# Patient Record
Sex: FEMALE | Race: WHITE | Employment: FULL TIME | URBAN - METROPOLITAN AREA
[De-identification: names, ages, dates, MRNs, and addresses within clinical notes are randomized per-mention and may not be internally consistent; named-entity substitution may affect disease eponyms.]

---

## 2017-04-25 ENCOUNTER — OFFICE VISIT (OUTPATIENT)
Dept: INTERNAL MEDICINE CLINIC | Age: 27
End: 2017-04-25

## 2017-04-25 VITALS
RESPIRATION RATE: 16 BRPM | WEIGHT: 117.7 LBS | HEIGHT: 65 IN | TEMPERATURE: 98.8 F | HEART RATE: 73 BPM | SYSTOLIC BLOOD PRESSURE: 132 MMHG | BODY MASS INDEX: 19.61 KG/M2 | OXYGEN SATURATION: 100 % | DIASTOLIC BLOOD PRESSURE: 79 MMHG

## 2017-04-25 DIAGNOSIS — F40.231 SEVERE NEEDLE PHOBIA: ICD-10-CM

## 2017-04-25 DIAGNOSIS — Z82.49 FAMILY HISTORY OF CARDIAC DISORDER IN FATHER: ICD-10-CM

## 2017-04-25 DIAGNOSIS — Z86.59 HISTORY OF DEPRESSION: ICD-10-CM

## 2017-04-25 DIAGNOSIS — E78.00 HYPERCHOLESTEROLEMIA: Primary | ICD-10-CM

## 2017-04-25 DIAGNOSIS — Z83.42 FAMILY HISTORY OF HYPERCHOLESTEROLEMIA: ICD-10-CM

## 2017-04-25 NOTE — PATIENT INSTRUCTIONS
YouFastUnlock Activation    Thank you for requesting access to YouFastUnlock. Please follow the instructions below to securely access and download your online medical record. YouFastUnlock allows you to send messages to your doctor, view your test results, renew your prescriptions, schedule appointments, and more. How Do I Sign Up? 1. In your internet browser, go to [email or web address].[email or web address] Tag  2. Click on the First Time User? Click Here link in the Sign In box. You will be redirect to the New Member Sign Up page. 3. Enter your YouFastUnlock Access Code exactly as it appears below. You will not need to use this code after youve completed the sign-up process. If you do not sign up before the expiration date, you must request a new code. YouFastUnlock Access Code: [email or web address]  Expires: 2017  9:14 AM (This is the date your YouFastUnlock access code will )    4. Enter the last four digits of your Social Security Number (xxxx) and Date of Birth (mm/dd/yyyy) as indicated and click Submit. You will be taken to the next sign-up page. 5. Create a YouFastUnlock ID. This will be your YouFastUnlock login ID and cannot be changed, so think of one that is secure and easy to remember. 6. Create a YouFastUnlock password. You can change your password at any time. 7. Enter your Password Reset Question and Answer. This can be used at a later time if you forget your password. 8. Enter your e-mail address. You will receive e-mail notification when new information is available in [address] 9. Click Sign Up. You can now view and download portions of your medical record. 10. Click the Download Summary menu link to download a portable copy of your medical information. Additional Information    If you have questions, please visit the Frequently Asked Questions section of the YouFastUnlock website at [email or web address]. [email or web address] Craft Coffee/Mettlhart/. Remember, YouFastUnlock is NOT to be used for urgent needs. For medical emergencies, dial 911. StopPixSpree. [employer]  For the next 21 days:  Write 3 things for which your grateful  Journal 2 minutes on one of the three items  Text/call/email- one person a day and tell them what your grateful for love about them etc.  Watch a very happy brain by Dr Caren Calhoun this

## 2017-04-25 NOTE — PROGRESS NOTES
Chief Complaint   Patient presents with    Establish Care     [facility])       Subjective:   Natalia Thompson 27 y.o.  female with a  past medical history reviewed see below. comes in today to establish care. Results from her work screening 3/17/17 reviewed BMI 20 bp 122/70 chol 333 hdl 76 non hdl 257 total chol/hdl 4.4 fasting glucose 88. 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 All other systems reviewed and are negative        Allergies   Allergen Reactions    Mushroom Nausea and Vomiting     Past Medical History:   Diagnosis Date    Depression     Hypercholesterolemia     PCOS (polycystic ovarian syndrome) 2009     History reviewed. No pertinent surgical history.   Family History   Problem Relation Age of Onset    Heart Disease Father 27      at age 45     Elevated Lipids Father     Thyroid Disease Sister    Rubi Stack Elevated Lipids Sister     Elevated Lipids Brother     Heart Disease Brother      ? congenital     Elevated Lipids Maternal Grandfather     Elevated Lipids Sister      Social History   Substance Use Topics    Smoking status: Never Smoker    Smokeless tobacco: Never Used    Alcohol use 4.2 oz/week     7 Glasses of wine per week          Objective:     Visit Vitals    /79 (BP 1 Location: Left arm, BP Patient Position: Sitting)    Pulse 73    Temp 98.8 °F (37.1 °C) (Oral)    Resp 16    Ht 5' 5\" (1.651 m)    Wt 117 lb 11.2 oz (53.4 kg)    LMP 2017    SpO2 100%    BMI 19.59 kg/m2     Gen: NAD, pleasant  HEENT: normal appearing head, nares patent, PERRLA, EOMI, oropharynx no erythema, no cervical lymphadenopathy neck supple   Cardio: RRR nl S1S2 no murmur  Lungs CTAB no wheeze no rales no rhonchi  ABD Soft non tender non distended + bowel sounds  Extremities: full ROM X 4 no clubbing no cyanosis  Neuro: no gross focal deficits noted, alert and orientated X 3  Psych.: well groomed anxious about blood draw + depression. See phq9 score of 7     Assessment/Plan:   Marisol Huertas was seen today for establish care. Diagnoses and all orders for this visit:    Hypercholesterolemia  -     NMR LIPOPROFILE  -     VITAMIN D, 25 HYDROXY  -     HEMOGLOBIN A1C WITH EAG  -     THYROID PANEL W/TSH  -     LIPOPROTEIN PHENOTYPING; Future  -     LIPOPROTEIN-ASSOCIATED PHOSPHOLIPASE A2, ACTIVITY  -     CHD: LDLR/APOB/PCSK9 FULL SEQ  -     APOLIPOPROTEIN B/A RATIO  -     METABOLIC PANEL, COMPREHENSIVE  -     CK  -     CBC WITH AUTOMATED DIFF  -     UA/M W/RFLX CULTURE, COMP  -     LIPOPROTEIN PHENOTYPING    Family history of hypercholesterolemia  -     NMR LIPOPROFILE  -     VITAMIN D, 25 HYDROXY  -     HEMOGLOBIN A1C WITH EAG  -     THYROID PANEL W/TSH  -     LIPOPROTEIN PHENOTYPING; Future  -     LIPOPROTEIN-ASSOCIATED PHOSPHOLIPASE A2, ACTIVITY  -     CHD: LDLR/APOB/PCSK9 FULL SEQ  -     APOLIPOPROTEIN B/A RATIO  -     METABOLIC PANEL, COMPREHENSIVE  -     CK  -     CBC WITH AUTOMATED DIFF  -     UA/M W/RFLX CULTURE, COMP  -     LIPOPROTEIN PHENOTYPING    Severe needle phobia-fainter!   -     NMR LIPOPROFILE  -     VITAMIN D, 25 HYDROXY  -     HEMOGLOBIN A1C WITH EAG  -     THYROID PANEL W/TSH  -     LIPOPROTEIN PHENOTYPING; Future  -     LIPOPROTEIN-ASSOCIATED PHOSPHOLIPASE A2, ACTIVITY  -     CHD: LDLR/APOB/PCSK9 FULL SEQ  -     APOLIPOPROTEIN B/A RATIO  -     METABOLIC PANEL, COMPREHENSIVE  -     CK  -     CBC WITH AUTOMATED DIFF  -     UA/M W/RFLX CULTURE, COMP  -     LIPOPROTEIN PHENOTYPING    History of depression  -     NMR LIPOPROFILE  -     VITAMIN D, 25 HYDROXY  -     HEMOGLOBIN A1C WITH EAG  -     THYROID PANEL W/TSH  -     LIPOPROTEIN PHENOTYPING;  Future  - LIPOPROTEIN-ASSOCIATED PHOSPHOLIPASE A2, ACTIVITY  -     CHD: LDLR/APOB/PCSK9 FULL SEQ  -     APOLIPOPROTEIN B/A RATIO  -     METABOLIC PANEL, COMPREHENSIVE  -     CK  -     CBC WITH AUTOMATED DIFF  -     UA/M W/RFLX CULTURE, COMP  -     LIPOPROTEIN PHENOTYPING    Family history of cardiac disorder in father  -     Clinton Haywood  -     VITAMIN D, 25 HYDROXY  -     HEMOGLOBIN A1C WITH EAG  -     THYROID PANEL W/TSH  -     LIPOPROTEIN PHENOTYPING; Future  -     LIPOPROTEIN-ASSOCIATED PHOSPHOLIPASE A2, ACTIVITY  -     CHD: LDLR/APOB/PCSK9 FULL SEQ  -     APOLIPOPROTEIN B/A RATIO  -     METABOLIC PANEL, COMPREHENSIVE  -     CK  -     CBC WITH AUTOMATED DIFF  -     UA/M W/RFLX CULTURE, COMP  -     LIPOPROTEIN PHENOTYPING      Follow-up Disposition:  Return in about 2 weeks (around 5/9/2017) for review labs 30 min please and cpe .increase cv exercise numbed pt for labs was unable to have done will return in the am avs printed and given to the pt. .  Verbal consent given for text messaging and number verified with pt. & Sent text message The patient voiced understanding of the above. Medication side effects were reviewed with the patient. Call with any concerns.

## 2017-04-25 NOTE — MR AVS SNAPSHOT
Visit Information Date & Time Provider Department Dept. Phone Encounter #  
 4/25/2017  8:30 AM Hayde Aleman MD [facility] [phone] [id number] Follow-up Instructions Return in about 2 weeks (around 5/9/2017) for review labs 30 min please and cpe . Upcoming Health Maintenance Date Due DTaP/Tdap/Td series (1 - Tdap) 8/9/2009 PAP AKA CERVICAL CYTOLOGY 8/9/2009 INFLUENZA AGE 9 TO ADULT 8/1/2016 Allergies as of 4/25/2017  Review Complete On: 4/25/2017 By: Hayed Aleman MD  
  
 Severity Noted Reaction Type Reactions Mushroom  04/25/2017    Nausea and Vomiting Current Immunizations  Reviewed on 4/25/2017 Name Date Influenza Vaccine 9/30/2016 Tdap 4/29/2016 Reviewed by Hayde Aleman MD on 4/25/2017 at  9:01 AM  
 Reviewed by Hayde Aleman MD on 4/25/2017 at  9:16 AM  
You Were Diagnosed With   
  
 Codes Comments Hypercholesterolemia    -  Primary ICD-10-CM: E78.00 ICD-9-CM: 272.0 Family history of hypercholesterolemia     ICD-10-CM: Z83.42 
ICD-9-CM: V18.19 Severe needle phobia     ICD-10-CM: F40.231 ICD-9-CM: 300.29 History of depression     ICD-10-CM: Z86.59 
ICD-9-CM: V11.8 Family history of cardiac disorder in father     ICD-10-CM: Z80.55 
ICD-9-CM: V17.49 Vitals BP Pulse Temp Resp Height(growth percentile) Weight(growth percentile) 132/79 (BP 1 Location: Left arm, BP Patient Position: Sitting) 73 98.8 °F (37.1 °C) (Oral) 16 5' 5\" (1.651 m) 117 lb 11.2 oz (53.4 kg) LMP SpO2 BMI OB Status Smoking Status 04/02/2017 100% 19.59 kg/m2 Having regular periods Never Smoker Vitals History BMI and BSA Data Body Mass Index Body Surface Area  
 19.59 kg/m 2 1.56 m 2 Your Updated Medication List  
  
Notice  As of 4/25/2017  9:30 AM  
 You have not been prescribed any medications. We Performed the Following APOLIPOPROTEIN B/A RATIO C6092238 CPT(R)] CBC WITH AUTOMATED DIFF [49400 CPT(R)] CHD: LDLR/APOB/PCSK9 FULL SEQ [[id number] Custom] CK A1204324 CPT(R)] HEMOGLOBIN A1C WITH EAG [87495 CPT(R)] LIPOPROTEIN-ASSOCIATED PHOSPHOLIPASE A2, ACTIVITY [[id number] Custom] METABOLIC PANEL, COMPREHENSIVE [41854 CPT(R)] NMR LIPOPROFILE [id number] CPT(R)] THYROID PANEL W/TSH [02661 CPT(R)] UA/M W/RFLX CULTURE, COMP [04273 CPT(R)] VITAMIN D, 25 HYDROXY G5458041 CPT(R)] Follow-up Instructions Return in about 2 weeks (around 2017) for review labs 30 min please and cpe . To-Do List   
 2017 Lab:  LIPOPROTEIN PHENOTYPING Patient Instructions MyChart Activation Thank you for requesting access to Cuff-Protect. Please follow the instructions below to securely access and download your online medical record. Cuff-Protect allows you to send messages to your doctor, view your test results, renew your prescriptions, schedule appointments, and more. How Do I Sign Up? 1. In your internet browser, go to [email or web address].Code for [employer] 
2. Click on the First Time User? Click Here link in the Sign In box. You will be redirect to the New Member Sign Up page. 3. Enter your Cuff-Protect Access Code exactly as it appears below. You will not need to use this code after youve completed the sign-up process. If you do not sign up before the expiration date, you must request a new code. Cuff-Protect Access Code: [email or web address] 
Expires: 2017  9:14 AM (This is the date your Cuff-Protect access code will ) 4. Enter the last four digits of your Social Security Number (xxxx) and Date of Birth (mm/dd/yyyy) as indicated and click Submit. You will be taken to the next sign-up page. 5. Create a Cuff-Protect ID. This will be your Cuff-Protect login ID and cannot be changed, so think of one that is secure and easy to remember. 6. Create a Cuff-Protect password. You can change your password at any time. 7. Enter your Password Reset Question and Answer. This can be used at a later time if you forget your password. 8. Enter your e-mail address. You will receive e-mail notification when new information is available in [address] 9. Click Sign Up. You can now view and download portions of your medical record. 10. Click the Download Summary menu link to download a portable copy of your medical information. Additional Information If you have questions, please visit the Frequently Asked Questions section of the BuildZoom website at [email or web address] Remember, BuildZoom is NOT to be used for urgent needs. For medical emergencies, dial 911. Clerk For the next 21 days: 
Write 3 things for which your grateful Journal 2 minutes on one of the three items Text/call/email- one person a day and tell them what your grateful for love about them etc. 
Watch a very happy brain by Dr Nieves Gave this Introducing [facility] & HEALTH SERVICES! Nadir Madrid introduces BuildZoom patient portal. Now you can access parts of your medical record, email your doctor's office, and request medication refills online. 1. In your internet browser, go to [email or web address] 2. Click on the First Time User? Click Here link in the Sign In box. You will see the New Member Sign Up page. 3. Enter your BuildZoom Access Code exactly as it appears below. You will not need to use this code after youve completed the sign-up process. If you do not sign up before the expiration date, you must request a new code. · BuildZoom Access Code: [email or web address] 
Expires: 7/24/2017  9:14 AM 
 
4. Enter the last four digits of your Social Security Number (xxxx) and Date of Birth (mm/dd/yyyy) as indicated and click Submit. You will be taken to the next sign-up page. 5. Create a BuildZoom ID.  This will be your BuildZoom login ID and cannot be changed, so think of one that is secure and easy to remember. 6. Create a GIS Cloud password. You can change your password at any time. 7. Enter your Password Reset Question and Answer. This can be used at a later time if you forget your password. 8. Enter your e-mail address. You will receive e-mail notification when new information is available in [address] 9. Click Sign Up. You can now view and download portions of your medical record. 10. Click the Download Summary menu link to download a portable copy of your medical information. If you have questions, please visit the Frequently Asked Questions section of the GIS Cloud website. Remember, GIS Cloud is NOT to be used for urgent needs. For medical emergencies, dial 911. Now available from your iPhone and Android! Please provide this summary of care documentation to your next provider. Your primary care clinician is listed as Jasbir Barth. If you have any questions after today's visit, please call [phone].

## 2017-05-02 LAB — LDL-C, EXTERNAL: 201

## 2017-05-04 ENCOUNTER — TELEPHONE (OUTPATIENT)
Dept: INTERNAL MEDICINE CLINIC | Age: 27
End: 2017-05-04

## 2017-05-04 ENCOUNTER — LAB ONLY (OUTPATIENT)
Dept: INTERNAL MEDICINE CLINIC | Age: 27
End: 2017-05-04

## 2017-05-04 DIAGNOSIS — E78.00 HYPERCHOLESTEROLEMIA: Primary | ICD-10-CM

## 2017-05-04 RX ORDER — ATORVASTATIN CALCIUM 40 MG/1
40 TABLET, FILM COATED ORAL DAILY
Qty: 30 TAB | Refills: 1 | Status: SHIPPED | OUTPATIENT
Start: 2017-05-04 | End: 2017-05-24 | Stop reason: SDUPTHER

## 2017-05-05 LAB
CHOLEST SERPL-MCNC: 295 MG/DL (ref 100–199)
HDL SERPL-SCNC: 31 UMOL/L
HDLC SERPL-MCNC: 70 MG/DL
INTERPRETATION, 910389: NORMAL
LDL SERPL QN: 21.9 NM
LDL SERPL-SCNC: 1976 NMOL/L
LDL SMALL SERPL-SCNC: 212 NMOL/L
LDL-C, EXTERNAL: 216
LDLC SERPL CALC-MCNC: 216 MG/DL (ref 0–99)
LP-IR SCORE SERPL: <25
TRIGL SERPL-MCNC: 46 MG/DL (ref 0–149)

## 2017-05-22 LAB — CREATININE, EXTERNAL: 0.7

## 2017-05-24 ENCOUNTER — OFFICE VISIT (OUTPATIENT)
Dept: INTERNAL MEDICINE CLINIC | Age: 27
End: 2017-05-24

## 2017-05-24 VITALS
WEIGHT: 117.6 LBS | OXYGEN SATURATION: 99 % | SYSTOLIC BLOOD PRESSURE: 108 MMHG | DIASTOLIC BLOOD PRESSURE: 67 MMHG | HEART RATE: 83 BPM | HEIGHT: 65 IN | BODY MASS INDEX: 19.59 KG/M2 | RESPIRATION RATE: 16 BRPM | TEMPERATURE: 96.2 F

## 2017-05-24 DIAGNOSIS — Z71.2 ENCOUNTER TO DISCUSS TEST RESULTS: ICD-10-CM

## 2017-05-24 DIAGNOSIS — E78.01 FAMILIAL HYPERCHOLESTEREMIA: Primary | ICD-10-CM

## 2017-05-24 LAB
25(OH)D3+25(OH)D2 SERPL-MCNC: 38.6 NG/ML (ref 30–100)
ALBUMIN SERPL-MCNC: 5.2 G/DL (ref 3.5–5.5)
ALBUMIN/GLOB SERPL: 2.2 {RATIO} (ref 1.2–2.2)
ALP SERPL-CCNC: 48 IU/L (ref 39–117)
ALT SERPL-CCNC: 12 IU/L (ref 0–32)
APO A-I SERPL-MCNC: 170 MG/DL (ref 116–209)
APO B SERPL-MCNC: 160 MG/DL (ref 54–133)
APO B/APO A-I SERPL: 0.9 RATIO UNITS (ref 0–0.6)
APOB+LDLR+PCSK9 GENE MUT ANL BLD/T: NORMAL
APPEARANCE UR: CLEAR
AST SERPL-CCNC: 16 IU/L (ref 0–40)
BACTERIA #/AREA URNS HPF: NORMAL /[HPF]
BASOPHILS # BLD AUTO: 0 X10E3/UL (ref 0–0.2)
BASOPHILS NFR BLD AUTO: 0 %
BILIRUB SERPL-MCNC: 0.3 MG/DL (ref 0–1.2)
BILIRUB UR QL STRIP: NEGATIVE
BUN SERPL-MCNC: 8 MG/DL (ref 6–20)
BUN/CREAT SERPL: 11 (ref 9–23)
CALCIUM SERPL-MCNC: 9.6 MG/DL (ref 8.7–10.2)
CASTS URNS QL MICRO: NORMAL /LPF
CHLORIDE SERPL-SCNC: 97 MMOL/L (ref 96–106)
CHOLEST SERPL-MCNC: 279 MG/DL (ref 100–199)
CK SERPL-CCNC: 44 U/L (ref 24–173)
CO2 SERPL-SCNC: 23 MMOL/L (ref 18–29)
COLOR UR: YELLOW
COMMENTS, 550733: NORMAL
CREAT SERPL-MCNC: 0.7 MG/DL (ref 0.57–1)
EOSINOPHIL # BLD AUTO: 0.1 X10E3/UL (ref 0–0.4)
EOSINOPHIL NFR BLD AUTO: 1 %
EPI CELLS #/AREA URNS HPF: NORMAL /HPF
ERYTHROCYTE [DISTWIDTH] IN BLOOD BY AUTOMATED COUNT: 13.4 % (ref 12.3–15.4)
EST. AVERAGE GLUCOSE BLD GHB EST-MCNC: 100 MG/DL
FT4I SERPL CALC-MCNC: 2.3 (ref 1.2–4.9)
GLOBULIN SER CALC-MCNC: 2.4 G/DL (ref 1.5–4.5)
GLUCOSE SERPL-MCNC: 88 MG/DL (ref 65–99)
GLUCOSE UR QL: NEGATIVE
HBA1C MFR BLD: 5.1 % (ref 4.8–5.6)
HCT VFR BLD AUTO: 40.9 % (ref 34–46.6)
HDLC SERPL-MCNC: 66 MG/DL
HGB BLD-MCNC: 13.5 G/DL (ref 11.1–15.9)
HGB UR QL STRIP: NEGATIVE
IMM GRANULOCYTES # BLD: 0 X10E3/UL (ref 0–0.1)
IMM GRANULOCYTES NFR BLD: 0 %
INTERPRETATION, 910389: NORMAL
INTERPRETATION, 910389: NORMAL
KETONES UR QL STRIP: NEGATIVE
LDLC SERPL CALC-MCNC: 201 MG/DL (ref 0–99)
LEUKOCYTE ESTERASE UR QL STRIP: NEGATIVE
LP SERPL ELPH-IMP: ABNORMAL
LP-PLA2(1) ACTIVITY: 34 NMOL/MIN/ML
LYMPHOCYTES # BLD AUTO: 2.2 X10E3/UL (ref 0.7–3.1)
LYMPHOCYTES NFR BLD AUTO: 41 %
MCH RBC QN AUTO: 31.5 PG (ref 26.6–33)
MCHC RBC AUTO-ENTMCNC: 33 G/DL (ref 31.5–35.7)
MCV RBC AUTO: 96 FL (ref 79–97)
MICRO URNS: ABNORMAL
MICRO URNS: ABNORMAL
MONOCYTES # BLD AUTO: 0.5 X10E3/UL (ref 0.1–0.9)
MONOCYTES NFR BLD AUTO: 10 %
NEUTROPHILS # BLD AUTO: 2.5 X10E3/UL (ref 1.4–7)
NEUTROPHILS NFR BLD AUTO: 48 %
NITRITE UR QL STRIP: NEGATIVE
PDF IMAGE, 910387: NORMAL
PH UR STRIP: 6.5 [PH] (ref 5–7.5)
PLATELET # BLD AUTO: 199 X10E3/UL (ref 150–379)
POTASSIUM SERPL-SCNC: 4.3 MMOL/L (ref 3.5–5.2)
PROT SERPL-MCNC: 7.6 G/DL (ref 6–8.5)
PROT UR QL STRIP: NEGATIVE
RBC # BLD AUTO: 4.28 X10E6/UL (ref 3.77–5.28)
RBC #/AREA URNS HPF: NORMAL /HPF
RELATIVE RISK: NORMAL
SODIUM SERPL-SCNC: 138 MMOL/L (ref 134–144)
SP GR UR: <=1.005 (ref 1–1.03)
T3RU NFR SERPL: 31 % (ref 24–39)
T4 SERPL-MCNC: 7.5 UG/DL (ref 4.5–12)
TRIGL SERPL-MCNC: 58 MG/DL (ref 0–149)
TSH SERPL DL<=0.005 MIU/L-ACNC: 2.53 UIU/ML (ref 0.45–4.5)
URINALYSIS REFLEX , 377201: ABNORMAL
UROBILINOGEN UR STRIP-MCNC: 0.2 MG/DL (ref 0.2–1)
VLDLC SERPL CALC-MCNC: 12 MG/DL (ref 5–40)
WBC # BLD AUTO: 5.3 X10E3/UL (ref 3.4–10.8)
WBC #/AREA URNS HPF: NORMAL /HPF

## 2017-05-24 RX ORDER — EZETIMIBE 10 MG/1
10 TABLET ORAL DAILY
Qty: 30 TAB | Refills: 0 | Status: SHIPPED | OUTPATIENT
Start: 2017-05-24 | End: 2017-06-22 | Stop reason: SDUPTHER

## 2017-05-24 RX ORDER — ATORVASTATIN CALCIUM 40 MG/1
40 TABLET, FILM COATED ORAL DAILY
Qty: 90 TAB | Refills: 1 | Status: SHIPPED | OUTPATIENT
Start: 2017-05-24

## 2017-05-24 NOTE — MR AVS SNAPSHOT
Visit Information Date & Time Provider Department Dept. Phone Encounter #  
 5/24/2017  8:45  [facility] [address] [facility] [place] [id number] Follow-up Instructions Return in about 1 month (around 6/24/2017) for POC lipid fasting refill and cpe with a pap 1 month 30 min . Upcoming Health Maintenance Date Due  
 PAP AKA CERVICAL CYTOLOGY 8/9/2009 INFLUENZA AGE 9 TO ADULT 8/1/2017 DTaP/Tdap/Td series (2 - Td) 4/29/2026 Allergies as of 5/24/2017  Review Complete On: 5/24/2017 By: Etta Bojorquez Severity Noted Reaction Type Reactions Mushroom  04/25/2017    Nausea and Vomiting Current Immunizations  Reviewed on 4/25/2017 Name Date Influenza Vaccine 9/30/2016 Tdap 4/29/2016 Not reviewed this visit You Were Diagnosed With   
  
 Codes Comments Familial hypercholesteremia    -  Primary ICD-10-CM: E78.01 
ICD-9-CM: 272.0 Encounter to discuss test results     ICD-10-CM: Z71.89 ICD-9-CM: V65.49 Vitals BP Pulse Temp Resp Height(growth percentile) Weight(growth percentile) 108/67 (BP 1 Location: Left arm, BP Patient Position: Sitting) 83 96.2 °F (35.7 °C) 16 5' 5\" (1.651 m) 117 lb 9.6 oz (53.3 kg) LMP SpO2 BMI OB Status Smoking Status 05/17/2017 99% 19.57 kg/m2 Having regular periods Never Smoker Vitals History BMI and BSA Data Body Mass Index Body Surface Area  
 19.57 kg/m 2 1.56 m 2 Preferred Pharmacy Pharmacy Name Phone [address] [facility], [facility]  [phone] Your Updated Medication List  
  
   
This list is accurate as of: 5/24/17  9:06 AM.  Always use your most recent med list.  
  
  
  
  
 atorvastatin 40 mg tablet Commonly known as:  LIPITOR Take 1 Tab by mouth daily. ezetimibe 10 mg tablet Commonly known as:  Lucía Barrientos Take 1 Tab by mouth daily. Prescriptions Sent to Pharmacy Refills  
 atorvastatin (LIPITOR) 40 mg tablet 1 Sig: Take 1 Tab by mouth daily. Class: Normal  
 Pharmacy: [address] Ph #: [phone] Route: Oral  
 ezetimibe (ZETIA) 10 mg tablet 0 Sig: Take 1 Tab by mouth daily. Class: Normal  
 Pharmacy: [address] Ph #: [phone] Route: Oral  
  
We Performed the Following REFERRAL TO GENETICS [LBE28 Custom] Comments:  
 Please evaluate patient for  Familial hypercholesterolemia family planning Follow-up Instructions Return in about 1 month (around 6/24/2017) for POC lipid fasting refill and cpe with a pap 1 month 30 min . Referral Information Referral ID Referred By Referred To  
  
 [id number] JOSEP MCLEOD [facility] [id number] Yordan Valdes Visits Status Start Date End Date 1 New Request 5/24/17 5/24/18 If your referral has a status of pending review or denied, additional information will be sent to support the outcome of this decision. Patient Instructions Lipid. org 
National lipid association. Introducing [facility] & HEALTH SERVICES! Dear Pratik Needs: Thank you for requesting a eyeOS account. Our records indicate that you already have an active eyeOS account. You can access your account anytime at [email or web address] Did you know that you can access your hospital and ER discharge instructions at any time in eyeOS? You can also review all of your test results from your hospital stay or ER visit. Additional Information If you have questions, please visit the Frequently Asked Questions section of the eyeOS website at [email or web address] Remember, MyChart is NOT to be used for urgent needs. For medical emergencies, dial 911. Now available from your iPhone and Android! Please provide this summary of care documentation to your next provider. Your primary care clinician is listed as Radha Roberts. If you have any questions after today's visit, please call [phone].

## 2017-05-24 NOTE — PROGRESS NOTES
Chief Complaint   Patient presents with    Results     Rm 7           Subjective:   Pravin German 27 y.o.  female with a  past medical history reviewed see below. comes in today for test results. ROS: otherwise feeling generally well. All other systems reviewed and are negative      Current Outpatient Prescriptions   Medication Sig Dispense Refill    atorvastatin (LIPITOR) 40 mg tablet Take 1 Tab by mouth daily. 90 Tab 1    ezetimibe (ZETIA) 10 mg tablet Take 1 Tab by mouth daily. 30 Tab 0     Allergies   Allergen Reactions    Mushroom Nausea and Vomiting     Past Medical History:   Diagnosis Date    Depression     Hypercholesterolemia     PCOS (polycystic ovarian syndrome) 2009     No past surgical history on file.   Family History   Problem Relation Age of Onset    Heart Disease Father 27      at age 45     Elevated Lipids Father     Thyroid Disease Sister    [facility] Elevated Lipids Sister     Elevated Lipids Brother     Heart Disease Brother      ? congenital     Elevated Lipids Maternal Grandfather     Elevated Lipids Sister      Social History   Substance Use Topics    Smoking status: Never Smoker    Smokeless tobacco: Never Used    Alcohol use 4.2 oz/week     7 Glasses of wine per week          Objective:     Visit Vitals    /67 (BP 1 Location: Left arm, BP Patient Position: Sitting)    Pulse 83    Temp 96.2 °F (35.7 °C)    Resp 16    Ht 5' 5\" (1.651 m)    Wt 117 lb 9.6 oz (53.3 kg)    LMP 2017    SpO2 99%    BMI 19.57 kg/m2     Gen: NAD, pleasant  HEENT: normal appearing head, nares patent, PERRLA, EOMI, oropharynx no erythema, no cervical lymphadenopathy neck supple   Cardio: RRR nl S1S2 no murmur  Lungs CTAB no wheeze no rales no rhonchi  ABD Soft non tender non distended + bowel sounds  Extremities: full ROM X 4 no clubbing no cyanosis  Neuro: no gross focal deficits noted, alert and orientated X 3  Psych.: well groomed no outward signs of depression. Assessment/Plan:   Caitlin Peraza was seen today for results. Diagnoses and all orders for this visit:    Familial hypercholesteremia  -     REFERRAL TO GENETICS    Encounter to discuss test results    Other orders  -     atorvastatin (LIPITOR) 40 mg tablet; Take 1 Tab by mouth daily. -     ezetimibe (ZETIA) 10 mg tablet; Take 1 Tab by mouth daily. Follow-up Disposition:  Return in about 1 month (around 6/24/2017) for POC lipid fasting refill and cpe with a pap 1 month 30 min . Samir de la cruz printed and given to the pt. .  Tart zetia in one month POC lipid   The patient voiced understanding of the above. Medication side effects were reviewed with the patient. Call with any concerns.

## 2017-06-22 ENCOUNTER — HOSPITAL ENCOUNTER (OUTPATIENT)
Dept: LAB | Age: 27
Discharge: HOME | End: 2017-06-22
Payer: COMMERCIAL

## 2017-06-22 ENCOUNTER — OFFICE VISIT (OUTPATIENT)
Dept: INTERNAL MEDICINE CLINIC | Age: 27
End: 2017-06-22

## 2017-06-22 VITALS
RESPIRATION RATE: 16 BRPM | BODY MASS INDEX: 19.84 KG/M2 | OXYGEN SATURATION: 99 % | WEIGHT: 119.1 LBS | HEIGHT: 65 IN | HEART RATE: 82 BPM | TEMPERATURE: 97.3 F | SYSTOLIC BLOOD PRESSURE: 115 MMHG | DIASTOLIC BLOOD PRESSURE: 64 MMHG

## 2017-06-22 DIAGNOSIS — N92.6 IRREGULAR PERIODS/MENSTRUAL CYCLES: ICD-10-CM

## 2017-06-22 DIAGNOSIS — E28.2 PCOS (POLYCYSTIC OVARIAN SYNDROME): ICD-10-CM

## 2017-06-22 DIAGNOSIS — N89.8 VAGINAL DISCHARGE: ICD-10-CM

## 2017-06-22 DIAGNOSIS — Z01.419 WELL WOMAN EXAM WITH ROUTINE GYNECOLOGICAL EXAM: Primary | ICD-10-CM

## 2017-06-22 LAB
CHOLEST SERPL-MCNC: 156 MG/DL
HDLC SERPL-MCNC: 56 MG/DL
LDL CHOLESTEROL POC: 90 MG/DL
NON-HDL GOAL (POC): 99
PH BODY FLUID, POCT, PHBFPOCT: 4
SOURCE POCT, SRCEPOCT: NORMAL
TCHOL/HDL RATIO (POC): 2.6
TRIGL SERPL-MCNC: 46 MG/DL

## 2017-06-22 PROCEDURE — 88175 CYTOPATH C/V AUTO FLUID REDO: CPT | Performed by: FAMILY MEDICINE

## 2017-06-22 RX ORDER — METFORMIN HYDROCHLORIDE 500 MG/1
250 TABLET ORAL DAILY
Qty: 90 TAB | Refills: 0 | Status: ON HOLD | OUTPATIENT
Start: 2017-06-22 | End: 2021-02-16

## 2017-06-22 RX ORDER — EZETIMIBE 10 MG/1
10 TABLET ORAL DAILY
Qty: 90 TAB | Refills: 1 | Status: SHIPPED | OUTPATIENT
Start: 2017-06-22 | End: 2020-02-11

## 2017-06-22 NOTE — PROGRESS NOTES
Chief Complaint   Patient presents with    Complete Physical    Gyn Exam     1. Have you been to the ER, urgent care clinic since your last visit? Hospitalized since your last visit? No    2. Have you seen or consulted any other health care providers outside of the [address] [place] [address] since your last visit? Include any pap smears or colon screening.  No

## 2017-06-22 NOTE — PROGRESS NOTES
Subjective:   27 y.o. female for annual routine Pap and checkup. Patient's last menstrual period was 2017. duration of menes  The last periods have not been normal normal cycyle is 28-32 days and 3 days the last two cycles had a period lasting 12 days and she could not confirm sh eovulated she has has been monitoring he temp and has + vag discharge \" egg white\" she has been spotting as well. She denies pain iwht intercourse but has abd tenderness on the left she has a h/o PCOS dx in    abnl pap denies   Last pap   std history denies  Declines testing   abnl mammogram na      ROS all other systems reviewed and are negative    Patient Active Problem List    Diagnosis Date Noted    Familial hypercholesteremia 2017    Severe needle phobia-fainter!  2017    Hypercholesterolemia 2017    History of depression 2017     Current Outpatient Prescriptions   Medication Sig Dispense Refill    atorvastatin (LIPITOR) 40 mg tablet Take 1 Tab by mouth daily. 90 Tab 1    ezetimibe (ZETIA) 10 mg tablet Take 1 Tab by mouth daily. 30 Tab 0     Allergies   Allergen Reactions    Mushroom Nausea and Vomiting     Past Medical History:   Diagnosis Date    Depression     Hypercholesterolemia     PCOS (polycystic ovarian syndrome) 2009     No past surgical history on file.   Family History   Problem Relation Age of Onset    Heart Disease Father 27      at age 45     Elevated Lipids Father     Thyroid Disease Sister    [facility] Elevated Lipids Sister     Elevated Lipids Brother     Heart Disease Brother      ? congenital     Elevated Lipids Maternal Grandfather     Elevated Lipids Sister      Social History   Substance Use Topics    Smoking status: Never Smoker    Smokeless tobacco: Never Used    Alcohol use 4.2 oz/week     7 Glasses of wine per week        Objective:     Visit Vitals    /64 (BP 1 Location: Left arm, BP Patient Position: Sitting)    Pulse 82    Temp 97.3 °F (36.3 °C) (Oral)    Resp 16    Ht 5' 5\" (1.651 m)    Wt 119 lb 1.6 oz (54 kg)    LMP 05/17/2017    SpO2 99%    BMI 19.82 kg/m2     The patient appears well, alert, oriented x 3, in no distress. ENT normal.  Neck supple. No adenopathy or thyromegaly. RUSTAM. Lungs are clear, good air entry, no wheezes, rhonchi or rales. S1 and S2 normal, no murmurs, regular rate and rhythm. Abdomen soft without tenderness, guarding, mass or organomegaly. Extremities show no edema, normal peripheral pulses. Neurological is normal, no focal findings. BREAST EXAM: breasts appear normal, no suspicious masses, no skin or nipple changes or axillary nodes    PELVIC EXAM: normal external genitalia, vulva, vagina, cervix, uterus and adnexa  + vaginal discharge   Assessment/Plan:   Daren Haywood was seen today for complete physical and gyn exam.    Diagnoses and all orders for this visit:    Well woman exam with routine gynecological exam  -     PAP (IMAGE GUIDED) W/REFL HPV ALL PATHOLOGY ([id number])  -     AMB POC LIPID PROFILE    PCOS (polycystic ovarian syndrome)    Vaginal discharge  -     AMB POC PH, BODY FLUID EXCEPT BLOOD    Irregular periods/menstrual cycles  -     PAP (IMAGE GUIDED) W/REFL HPV ALL PATHOLOGY ([id number])    Other orders  -     metFORMIN (GLUCOPHAGE) 500 mg tablet; Take 0.5 Tabs by mouth daily. And increase as directed  -     ezetimibe (ZETIA) 10 mg tablet; Take 1 Tab by mouth daily. Follow-up Disposition:  Return in about 3 months (around 9/22/2017) for recheck chol  15 min . Gevena Agnes well woman  mammogram  pap smear  return annually or prn The patient voiced understanding of the above. Medication side effects were reviewed with the patient. Advised to call with any concerns. poc lipid done results see nursing note.

## 2017-06-22 NOTE — MR AVS SNAPSHOT
Visit Information Date & Time Provider Department Dept. Phone Encounter #  
 6/22/2017  8:00 AM [facility] [phone] [id number] Follow-up Instructions Return in about 3 months (around 9/22/2017) for recheck chol  15 min . Upcoming Health Maintenance Date Due  
 PAP AKA CERVICAL CYTOLOGY 8/9/2009 INFLUENZA AGE 9 TO ADULT 8/1/2017 DTaP/Tdap/Td series (2 - Td) 4/29/2026 Allergies as of 6/22/2017  Review Complete On: 6/22/2017 By: [facility], MD  
  
 Severity Noted Reaction Type Reactions Mushroom  04/25/2017    Nausea and Vomiting Current Immunizations  Reviewed on 4/25/2017 Name Date Influenza Vaccine 9/30/2016 Tdap 4/29/2016 Not reviewed this visit You Were Diagnosed With   
  
 Codes Comments Well woman exam with routine gynecological exam    -  Primary ICD-10-CM: W06.288 ICD-9-CM: V72.31 Vaginal discharge     ICD-10-CM: N89.8 ICD-9-CM: 623.5 Vitals BP Pulse Temp Resp Height(growth percentile) Weight(growth percentile)  
 115/64 (BP 1 Location: Left arm, BP Patient Position: Sitting) 82 97.3 °F (36.3 °C) (Oral) 16 5' 5\" (1.651 m) 119 lb 1.6 oz (54 kg) LMP SpO2 BMI OB Status Smoking Status 05/17/2017 99% 19.82 kg/m2 Having regular periods Never Smoker Vitals History BMI and BSA Data Body Mass Index Body Surface Area  
 19.82 kg/m 2 1.57 m 2 Preferred Pharmacy Pharmacy Name Phone [facility], [address] [phone] Your Updated Medication List  
  
   
This list is accurate as of: 6/22/17  8:50 AM.  Always use your most recent med list.  
  
  
  
  
 atorvastatin 40 mg tablet Commonly known as:  LIPITOR Take 1 Tab by mouth daily. ezetimibe 10 mg tablet Commonly known as:  Bia Blazer Take 1 Tab by mouth daily. metFORMIN 500 mg tablet Commonly known as:  GLUCOPHAGE Take 0.5 Tabs by mouth daily. And increase as directed Prescriptions Sent to Pharmacy Refills  
 metFORMIN (GLUCOPHAGE) 500 mg tablet 0 Sig: Take 0.5 Tabs by mouth daily. And increase as directed Class: Normal  
 Pharmacy: [address] Ph #: [phone] Route: Oral  
 ezetimibe (ZETIA) 10 mg tablet 1 Sig: Take 1 Tab by mouth daily. Class: Normal  
 Pharmacy: [address] Ph #: [phone] Route: Oral  
  
We Performed the Following AMB POC PH, BODY FLUID EXCEPT BLOOD [41738 CPT(R)] Follow-up Instructions Return in about 3 months (around 9/22/2017) for recheck chol  15 min . Patient Instructions Metformin Learning About Metformin Introduction Metformin (such as Glucophage) is a medicine used to treat type 2 diabetes. It helps keep blood sugar levels on target. It is also used for PCOS You may have tried to eat a healthy diet, lose weight, and get more exercise to keep your blood sugar in your target range. If those things do not help, you may take a medicine called metformin. It helps your body use insulin. This can help you control your blood sugar. You might take it on its own or with other medicines. When taken on its own, metformin should not cause low blood sugar or weight gain. Example · Metformin (Glucophage) Possible side effects Common side effects include: · Short-term nausea. · Not feeling hungry. · Diarrhea. · Increased gas in your belly. · A metallic taste. You may have side effects or reactions not listed here. Check the information that comes with your medicine. What to know about taking this medicine · Metformin does not usually cause low blood sugar.  But you may get a low blood sugar when you take metformin and you exercise hard, drink alcohol, or you do not eat enough food. · Sometimes metformin is combined with other diabetes medicine. Some of these can cause low blood sugar. · If you need a test that uses a dye or you need to have surgery, be sure to tell all of your doctors that you take metformin. You may have to stop taking it before and after the test or surgery. · Be safe with medicines. Take your medicines exactly as prescribed. Call your doctor if you think you are having a problem with your medicine. · Check with your doctor or pharmacist before you use any other medicines. This includes over-the-counter medicines. Make sure your doctor knows all of the medicines, vitamins, herbal products, and supplements you take. Taking some medicines together can cause problems. Where can you learn more? Go to http://alem-radha.info/. Enter Chaparrita Rader in the search box to learn more about \"Learning About Metformin for Type 2 Diabetes. \" Current as of: March 13, 2017 Content Version: 11.3 © 3162-6364 Patient Feed. Care instructions adapted under license by [employer] (which disclaims liability or warranty for this information). If you have questions about a medical condition or this instruction, always ask your healthcare professional. [address] any warranty or liability for your use of this information. Introducing [facility] [place] [facility]! Dear Nikki Vo: Thank you for requesting a [employer] account. Our records indicate that you already have an active [employer] account. You can access your account anytime at [email or web address]. [employer] Did you know that you can access your hospital and ER discharge instructions at any time in [employer] You can also review all of your test results from your hospital stay or ER visit. Additional Information If you have questions, please visit the Frequently Asked Questions section of the [employer] website at [email or web address] Remember, LikeWhere is NOT to be used for urgent needs. For medical emergencies, dial 911. Now available from your iPhone and Android! Please provide this summary of care documentation to your next provider. Your primary care clinician is listed as April Mitchell. If you have any questions after today's visit, please call [phone].

## 2017-06-22 NOTE — PATIENT INSTRUCTIONS
Metformin      Learning About Metformin   Introduction  Metformin (such as Glucophage) is a medicine used to treat type 2 diabetes. It helps keep blood sugar levels on target. It is also used for PCOS  You may have tried to eat a healthy diet, lose weight, and get more exercise to keep your blood sugar in your target range. If those things do not help, you may take a medicine called metformin. It helps your body use insulin. This can help you control your blood sugar. You might take it on its own or with other medicines. When taken on its own, metformin should not cause low blood sugar or weight gain. Example  · Metformin (Glucophage)  Possible side effects  Common side effects include:  · Short-term nausea. · Not feeling hungry. · Diarrhea. · Increased gas in your belly. · A metallic taste. You may have side effects or reactions not listed here. Check the information that comes with your medicine. What to know about taking this medicine  · Metformin does not usually cause low blood sugar. But you may get a low blood sugar when you take metformin and you exercise hard, drink alcohol, or you do not eat enough food. · Sometimes metformin is combined with other diabetes medicine. Some of these can cause low blood sugar. · If you need a test that uses a dye or you need to have surgery, be sure to tell all of your doctors that you take metformin. You may have to stop taking it before and after the test or surgery. · Be safe with medicines. Take your medicines exactly as prescribed. Call your doctor if you think you are having a problem with your medicine. · Check with your doctor or pharmacist before you use any other medicines. This includes over-the-counter medicines. Make sure your doctor knows all of the medicines, vitamins, herbal products, and supplements you take. Taking some medicines together can cause problems. Where can you learn more? Go to http://trang.info/.   Enter J360 in the search box to learn more about \"Learning About Metformin for Type 2 Diabetes. \"  Current as of: March 13, 2017  Content Version: 11.3  © 6597-6713 [employer], [employer]. Care instructions adapted under license by [employer] (which disclaims liability or warranty for this information). If you have questions about a medical condition or this instruction, always ask your healthcare professional. [place] [address] any warranty or liability for your use of this information.

## 2020-02-11 ENCOUNTER — HOSPITAL ENCOUNTER (OUTPATIENT)
Dept: NON INVASIVE DIAGNOSTICS | Age: 30
Discharge: HOME | End: 2020-02-11
Attending: INTERNAL MEDICINE
Payer: COMMERCIAL

## 2020-02-11 VITALS
WEIGHT: 124 LBS | BODY MASS INDEX: 20.66 KG/M2 | HEIGHT: 65 IN | DIASTOLIC BLOOD PRESSURE: 82 MMHG | SYSTOLIC BLOOD PRESSURE: 153 MMHG

## 2020-02-11 DIAGNOSIS — R07.9 CHEST PAIN: ICD-10-CM

## 2020-02-11 DIAGNOSIS — R01.1 CARDIAC MURMUR, UNSPECIFIED: ICD-10-CM

## 2020-02-11 LAB
STRESS ANGINA INDEX: 0
STRESS BASELINE DIAS BP: 95 MMHG
STRESS BASELINE HR: 75 BPM
STRESS BASELINE SYS BP: 150 MMHG
STRESS ESTIMATED WORKLOAD: 10.1 METS
STRESS EXERCISE DUR MIN: NORMAL
STRESS PEAK DIAS BP: 82 MMHG
STRESS PEAK SYS BP: 160 MMHG
STRESS PERCENT HR ACHIEVED: 90 %
STRESS POST PEAK HR: 171 BPM
STRESS RATE PRESSURE PRODUCT: NORMAL BPM*MMHG
STRESS ST DEPRESSION: 0 MM
STRESS ST ELEVATION: 0 MM
STRESS TARGET HR: 189 BPM

## 2020-02-11 PROCEDURE — 93351 STRESS TTE COMPLETE: CPT

## 2020-02-11 RX ORDER — ASPIRIN 325 MG
325 TABLET ORAL DAILY
COMMUNITY
End: 2021-01-28

## 2020-02-11 RX ORDER — HYDROGEN PEROXIDE 3 %
SOLUTION, NON-ORAL MISCELLANEOUS DAILY
COMMUNITY
End: 2021-01-28

## 2020-12-29 ENCOUNTER — TRANSCRIBE ORDER (OUTPATIENT)
Dept: SCHEDULING | Age: 30
End: 2020-12-29

## 2020-12-29 DIAGNOSIS — E28.2 POLYCYSTIC OVARIES: Primary | ICD-10-CM

## 2020-12-29 DIAGNOSIS — Z31.41 FERTILITY TESTING: ICD-10-CM

## 2021-01-06 ENCOUNTER — HOSPITAL ENCOUNTER (OUTPATIENT)
Dept: GENERAL RADIOLOGY | Age: 31
Discharge: HOME | End: 2021-01-06
Attending: OBSTETRICS & GYNECOLOGY
Payer: COMMERCIAL

## 2021-01-06 DIAGNOSIS — E28.2 POLYCYSTIC OVARIES: ICD-10-CM

## 2021-01-06 DIAGNOSIS — Z31.41 FERTILITY TESTING: ICD-10-CM

## 2021-01-06 PROCEDURE — 74011000636 HC RX REV CODE- 636: Performed by: OBSTETRICS & GYNECOLOGY

## 2021-01-06 PROCEDURE — 58340 CATHETER FOR HYSTEROGRAPHY: CPT

## 2021-01-06 RX ADMIN — IOHEXOL 20 ML: 240 INJECTION, SOLUTION INTRATHECAL; INTRAVASCULAR; INTRAVENOUS; ORAL at 12:00

## 2021-01-21 ENCOUNTER — ANESTHESIA EVENT (OUTPATIENT)
Dept: SURGERY | Age: 31
End: 2021-01-21
Payer: COMMERCIAL

## 2021-01-28 ENCOUNTER — HOSPITAL ENCOUNTER (OUTPATIENT)
Dept: PREADMISSION TESTING | Age: 31
Discharge: HOME | End: 2021-01-28
Payer: COMMERCIAL

## 2021-01-28 VITALS
WEIGHT: 113.98 LBS | HEART RATE: 60 BPM | HEIGHT: 64 IN | SYSTOLIC BLOOD PRESSURE: 120 MMHG | OXYGEN SATURATION: 95 % | TEMPERATURE: 97.8 F | BODY MASS INDEX: 19.46 KG/M2 | DIASTOLIC BLOOD PRESSURE: 64 MMHG

## 2021-01-28 LAB
ANION GAP SERPL CALC-SCNC: 5 MMOL/L (ref 5–15)
ATRIAL RATE: 62 BPM
BASOPHILS # BLD: 0.1 K/UL (ref 0–0.1)
BASOPHILS NFR BLD: 1 % (ref 0–1)
BUN SERPL-MCNC: 14 MG/DL (ref 6–20)
BUN/CREAT SERPL: 18 (ref 12–20)
CALCIUM SERPL-MCNC: 9.3 MG/DL (ref 8.5–10.1)
CALCULATED P AXIS, ECG09: 60 DEGREES
CALCULATED R AXIS, ECG10: 75 DEGREES
CALCULATED T AXIS, ECG11: 46 DEGREES
CHLORIDE SERPL-SCNC: 108 MMOL/L (ref 97–108)
CO2 SERPL-SCNC: 24 MMOL/L (ref 21–32)
CREAT SERPL-MCNC: 0.77 MG/DL (ref 0.55–1.02)
DIAGNOSIS, 93000: NORMAL
DIFFERENTIAL METHOD BLD: NORMAL
EOSINOPHIL # BLD: 0.1 K/UL (ref 0–0.4)
EOSINOPHIL NFR BLD: 1 % (ref 0–7)
ERYTHROCYTE [DISTWIDTH] IN BLOOD BY AUTOMATED COUNT: 12.4 % (ref 11.5–14.5)
GLUCOSE SERPL-MCNC: 89 MG/DL (ref 65–100)
HCT VFR BLD AUTO: 42.8 % (ref 35–47)
HGB BLD-MCNC: 14.3 G/DL (ref 11.5–16)
IMM GRANULOCYTES # BLD AUTO: 0 K/UL (ref 0–0.04)
IMM GRANULOCYTES NFR BLD AUTO: 0 % (ref 0–0.5)
LYMPHOCYTES # BLD: 2.5 K/UL (ref 0.8–3.5)
LYMPHOCYTES NFR BLD: 36 % (ref 12–49)
MCH RBC QN AUTO: 31.6 PG (ref 26–34)
MCHC RBC AUTO-ENTMCNC: 33.4 G/DL (ref 30–36.5)
MCV RBC AUTO: 94.7 FL (ref 80–99)
MONOCYTES # BLD: 0.5 K/UL (ref 0–1)
MONOCYTES NFR BLD: 7 % (ref 5–13)
NEUTS SEG # BLD: 3.8 K/UL (ref 1.8–8)
NEUTS SEG NFR BLD: 55 % (ref 32–75)
NRBC # BLD: 0 K/UL (ref 0–0.01)
NRBC BLD-RTO: 0 PER 100 WBC
P-R INTERVAL, ECG05: 182 MS
PLATELET # BLD AUTO: 224 K/UL (ref 150–400)
PMV BLD AUTO: 11.7 FL (ref 8.9–12.9)
POTASSIUM SERPL-SCNC: 5 MMOL/L (ref 3.5–5.1)
Q-T INTERVAL, ECG07: 420 MS
QRS DURATION, ECG06: 84 MS
QTC CALCULATION (BEZET), ECG08: 426 MS
RBC # BLD AUTO: 4.52 M/UL (ref 3.8–5.2)
SODIUM SERPL-SCNC: 137 MMOL/L (ref 136–145)
VENTRICULAR RATE, ECG03: 62 BPM
WBC # BLD AUTO: 7 K/UL (ref 3.6–11)

## 2021-01-28 PROCEDURE — 85025 COMPLETE CBC W/AUTO DIFF WBC: CPT

## 2021-01-28 PROCEDURE — 80048 BASIC METABOLIC PNL TOTAL CA: CPT

## 2021-01-28 PROCEDURE — 36415 COLL VENOUS BLD VENIPUNCTURE: CPT

## 2021-01-28 PROCEDURE — 93005 ELECTROCARDIOGRAM TRACING: CPT

## 2021-01-28 RX ORDER — GLUCOSAMINE SULFATE 1500 MG
1000 POWDER IN PACKET (EA) ORAL
COMMUNITY

## 2021-01-28 RX ORDER — SPIRONOLACTONE 25 MG/1
25 TABLET ORAL
COMMUNITY

## 2021-02-12 ENCOUNTER — TRANSCRIBE ORDER (OUTPATIENT)
Dept: REGISTRATION | Age: 31
End: 2021-02-12

## 2021-02-12 ENCOUNTER — HOSPITAL ENCOUNTER (OUTPATIENT)
Dept: PREADMISSION TESTING | Age: 31
Discharge: HOME | End: 2021-02-12
Payer: COMMERCIAL

## 2021-02-12 DIAGNOSIS — Z01.812 PRE-PROCEDURE LAB EXAM: ICD-10-CM

## 2021-02-12 DIAGNOSIS — Z01.812 PRE-PROCEDURE LAB EXAM: Primary | ICD-10-CM

## 2021-02-12 PROCEDURE — U0003 INFECTIOUS AGENT DETECTION BY NUCLEIC ACID (DNA OR RNA); SEVERE ACUTE RESPIRATORY SYNDROME CORONAVIRUS 2 (SARS-COV-2) (CORONAVIRUS DISEASE [COVID-19]), AMPLIFIED PROBE TECHNIQUE, MAKING USE OF HIGH THROUGHPUT TECHNOLOGIES AS DESCRIBED BY CMS-2020-01-R: HCPCS

## 2021-02-13 LAB — SARS-COV-2, COV2NT: NOT DETECTED

## 2021-02-16 ENCOUNTER — HOSPITAL ENCOUNTER (OUTPATIENT)
Age: 31
Setting detail: OBSERVATION
Discharge: HOME | End: 2021-02-17
Attending: OBSTETRICS & GYNECOLOGY | Admitting: OBSTETRICS & GYNECOLOGY
Payer: COMMERCIAL

## 2021-02-16 ENCOUNTER — ANESTHESIA (OUTPATIENT)
Dept: SURGERY | Age: 31
End: 2021-02-16
Payer: COMMERCIAL

## 2021-02-16 DIAGNOSIS — Z98.890 S/P MYOMECTOMY: Primary | ICD-10-CM

## 2021-02-16 PROBLEM — D21.9 FIBROIDS: Status: ACTIVE | Noted: 2021-02-16

## 2021-02-16 LAB — HCG UR QL: NEGATIVE

## 2021-02-16 PROCEDURE — 74011250636 HC RX REV CODE- 250/636: Performed by: OBSTETRICS & GYNECOLOGY

## 2021-02-16 PROCEDURE — 74011250637 HC RX REV CODE- 250/637: Performed by: OBSTETRICS & GYNECOLOGY

## 2021-02-16 PROCEDURE — 77030040361 HC SLV COMPR DVT MDII -B: Performed by: OBSTETRICS & GYNECOLOGY

## 2021-02-16 PROCEDURE — 74011000258 HC RX REV CODE- 258: Performed by: NURSE ANESTHETIST, CERTIFIED REGISTERED

## 2021-02-16 PROCEDURE — 77030035277 HC OBTRTR BLDELSS DISP INTU -B: Performed by: OBSTETRICS & GYNECOLOGY

## 2021-02-16 PROCEDURE — 74011250636 HC RX REV CODE- 250/636: Performed by: NURSE ANESTHETIST, CERTIFIED REGISTERED

## 2021-02-16 PROCEDURE — 74011000258 HC RX REV CODE- 258: Performed by: OBSTETRICS & GYNECOLOGY

## 2021-02-16 PROCEDURE — 77030008598 HC TRCR ENDOSC BLDLS J&J -B: Performed by: OBSTETRICS & GYNECOLOGY

## 2021-02-16 PROCEDURE — 77030008603 HC TRCR ENDOSC EPATH J&J -C: Performed by: OBSTETRICS & GYNECOLOGY

## 2021-02-16 PROCEDURE — 77030031139 HC SUT VCRL2 J&J -A: Performed by: OBSTETRICS & GYNECOLOGY

## 2021-02-16 PROCEDURE — 77030040830 HC CATH URETH FOL MDII -A: Performed by: OBSTETRICS & GYNECOLOGY

## 2021-02-16 PROCEDURE — 77030026243 HC MANIP UTER VCAR LSIS -B: Performed by: OBSTETRICS & GYNECOLOGY

## 2021-02-16 PROCEDURE — 74011000250 HC RX REV CODE- 250: Performed by: NURSE ANESTHETIST, CERTIFIED REGISTERED

## 2021-02-16 PROCEDURE — 88305 TISSUE EXAM BY PATHOLOGIST: CPT

## 2021-02-16 PROCEDURE — 77030016151 HC PROTCTR LNS DFOG COVD -B: Performed by: OBSTETRICS & GYNECOLOGY

## 2021-02-16 PROCEDURE — 2709999900 HC NON-CHARGEABLE SUPPLY: Performed by: OBSTETRICS & GYNECOLOGY

## 2021-02-16 PROCEDURE — 74011000636 HC RX REV CODE- 636: Performed by: OBSTETRICS & GYNECOLOGY

## 2021-02-16 PROCEDURE — 74011000250 HC RX REV CODE- 250: Performed by: OBSTETRICS & GYNECOLOGY

## 2021-02-16 PROCEDURE — 74011250637 HC RX REV CODE- 250/637: Performed by: ANESTHESIOLOGY

## 2021-02-16 PROCEDURE — 77030003666 HC NDL SPINAL BD -A: Performed by: OBSTETRICS & GYNECOLOGY

## 2021-02-16 PROCEDURE — 77030002895 HC DEV VASC CLOSR COVD -B: Performed by: OBSTETRICS & GYNECOLOGY

## 2021-02-16 PROCEDURE — 77030020263 HC SOL INJ SOD CL0.9% LFCR 1000ML: Performed by: OBSTETRICS & GYNECOLOGY

## 2021-02-16 PROCEDURE — 77030040922 HC BLNKT HYPOTHRM STRY -A

## 2021-02-16 PROCEDURE — 77030039147 HC PWDR HEMSTS SURGICEL JNJ -D: Performed by: OBSTETRICS & GYNECOLOGY

## 2021-02-16 PROCEDURE — 77030026438 HC STYL ET INTUB CARD -A: Performed by: ANESTHESIOLOGY

## 2021-02-16 PROCEDURE — 81025 URINE PREGNANCY TEST: CPT

## 2021-02-16 PROCEDURE — 77030013252 HC APPL DUPLOSPRY BAXT -B: Performed by: OBSTETRICS & GYNECOLOGY

## 2021-02-16 PROCEDURE — 77030008771 HC TU NG SALEM SUMP -A: Performed by: ANESTHESIOLOGY

## 2021-02-16 PROCEDURE — 99218 HC RM OBSERVATION: CPT

## 2021-02-16 PROCEDURE — 77030008557 HC TBNG SMK EVAC STOR -B: Performed by: OBSTETRICS & GYNECOLOGY

## 2021-02-16 PROCEDURE — 74011250636 HC RX REV CODE- 250/636: Performed by: ANESTHESIOLOGY

## 2021-02-16 PROCEDURE — 77030041236 HC APPL SURG ENDO JNJ -B: Performed by: OBSTETRICS & GYNECOLOGY

## 2021-02-16 PROCEDURE — 77030038613 HC SUT PDS STRATA SPIRL J&J -B: Performed by: OBSTETRICS & GYNECOLOGY

## 2021-02-16 PROCEDURE — 77030029357 HC DEV CLSR FAC SYS EFX TELE -C: Performed by: OBSTETRICS & GYNECOLOGY

## 2021-02-16 PROCEDURE — 76060000035 HC ANESTHESIA 2 TO 2.5 HR: Performed by: OBSTETRICS & GYNECOLOGY

## 2021-02-16 PROCEDURE — 76010000876 HC OR TIME 2 TO 2.5HR INTENSV - TIER 2: Performed by: OBSTETRICS & GYNECOLOGY

## 2021-02-16 PROCEDURE — 77030010507 HC ADH SKN DERMBND J&J -B: Performed by: OBSTETRICS & GYNECOLOGY

## 2021-02-16 PROCEDURE — 77030002933 HC SUT MCRYL J&J -A: Performed by: OBSTETRICS & GYNECOLOGY

## 2021-02-16 PROCEDURE — 74011000272 HC RX REV CODE- 272: Performed by: OBSTETRICS & GYNECOLOGY

## 2021-02-16 PROCEDURE — 77030013079 HC BLNKT BAIR HGGR 3M -A: Performed by: ANESTHESIOLOGY

## 2021-02-16 PROCEDURE — 77030003578 HC NDL INSUF VERES AMR -B: Performed by: OBSTETRICS & GYNECOLOGY

## 2021-02-16 PROCEDURE — 77030008684 HC TU ET CUF COVD -B: Performed by: ANESTHESIOLOGY

## 2021-02-16 PROCEDURE — 77030009851 HC PCH RTVR ENDOSC AMR -B: Performed by: OBSTETRICS & GYNECOLOGY

## 2021-02-16 PROCEDURE — 76210000017 HC OR PH I REC 1.5 TO 2 HR: Performed by: OBSTETRICS & GYNECOLOGY

## 2021-02-16 PROCEDURE — 77030008756 HC TU IRR SUC STRY -B: Performed by: OBSTETRICS & GYNECOLOGY

## 2021-02-16 RX ORDER — FENTANYL CITRATE 50 UG/ML
INJECTION, SOLUTION INTRAMUSCULAR; INTRAVENOUS AS NEEDED
Status: DISCONTINUED | OUTPATIENT
Start: 2021-02-16 | End: 2021-02-16 | Stop reason: HOSPADM

## 2021-02-16 RX ORDER — MIDAZOLAM HYDROCHLORIDE 1 MG/ML
0.5 INJECTION, SOLUTION INTRAMUSCULAR; INTRAVENOUS
Status: DISCONTINUED | OUTPATIENT
Start: 2021-02-16 | End: 2021-02-16 | Stop reason: HOSPADM

## 2021-02-16 RX ORDER — SODIUM CHLORIDE, SODIUM LACTATE, POTASSIUM CHLORIDE, CALCIUM CHLORIDE 600; 310; 30; 20 MG/100ML; MG/100ML; MG/100ML; MG/100ML
100 INJECTION, SOLUTION INTRAVENOUS CONTINUOUS
Status: DISCONTINUED | OUTPATIENT
Start: 2021-02-16 | End: 2021-02-17 | Stop reason: HOSPADM

## 2021-02-16 RX ORDER — ONDANSETRON 2 MG/ML
4 INJECTION INTRAMUSCULAR; INTRAVENOUS AS NEEDED
Status: DISCONTINUED | OUTPATIENT
Start: 2021-02-16 | End: 2021-02-16 | Stop reason: HOSPADM

## 2021-02-16 RX ORDER — KETOROLAC TROMETHAMINE 30 MG/ML
30 INJECTION, SOLUTION INTRAMUSCULAR; INTRAVENOUS
Status: DISCONTINUED | OUTPATIENT
Start: 2021-02-16 | End: 2021-02-16

## 2021-02-16 RX ORDER — SODIUM CHLORIDE 0.9 % (FLUSH) 0.9 %
5-40 SYRINGE (ML) INJECTION EVERY 8 HOURS
Status: DISCONTINUED | OUTPATIENT
Start: 2021-02-16 | End: 2021-02-17 | Stop reason: HOSPADM

## 2021-02-16 RX ORDER — MIDAZOLAM HYDROCHLORIDE 1 MG/ML
1 INJECTION, SOLUTION INTRAMUSCULAR; INTRAVENOUS AS NEEDED
Status: DISCONTINUED | OUTPATIENT
Start: 2021-02-16 | End: 2021-02-16 | Stop reason: HOSPADM

## 2021-02-16 RX ORDER — SODIUM CHLORIDE 9 MG/ML
25 INJECTION, SOLUTION INTRAVENOUS CONTINUOUS
Status: DISCONTINUED | OUTPATIENT
Start: 2021-02-16 | End: 2021-02-16 | Stop reason: HOSPADM

## 2021-02-16 RX ORDER — METRONIDAZOLE 500 MG/100ML
500 INJECTION, SOLUTION INTRAVENOUS ONCE
Status: COMPLETED | OUTPATIENT
Start: 2021-02-16 | End: 2021-02-16

## 2021-02-16 RX ORDER — SODIUM CHLORIDE 0.9 % (FLUSH) 0.9 %
5-40 SYRINGE (ML) INJECTION EVERY 8 HOURS
Status: DISCONTINUED | OUTPATIENT
Start: 2021-02-16 | End: 2021-02-16 | Stop reason: HOSPADM

## 2021-02-16 RX ORDER — DIPHENHYDRAMINE HYDROCHLORIDE 50 MG/ML
12.5 INJECTION, SOLUTION INTRAMUSCULAR; INTRAVENOUS AS NEEDED
Status: DISCONTINUED | OUTPATIENT
Start: 2021-02-16 | End: 2021-02-16 | Stop reason: HOSPADM

## 2021-02-16 RX ORDER — LIDOCAINE HYDROCHLORIDE 10 MG/ML
INJECTION INFILTRATION; PERINEURAL AS NEEDED
Status: DISCONTINUED | OUTPATIENT
Start: 2021-02-16 | End: 2021-02-16 | Stop reason: HOSPADM

## 2021-02-16 RX ORDER — KETOROLAC TROMETHAMINE 30 MG/ML
30 INJECTION, SOLUTION INTRAMUSCULAR; INTRAVENOUS
Status: DISCONTINUED | OUTPATIENT
Start: 2021-02-16 | End: 2021-02-17 | Stop reason: HOSPADM

## 2021-02-16 RX ORDER — SUCCINYLCHOLINE CHLORIDE 20 MG/ML
INJECTION INTRAMUSCULAR; INTRAVENOUS AS NEEDED
Status: DISCONTINUED | OUTPATIENT
Start: 2021-02-16 | End: 2021-02-16 | Stop reason: HOSPADM

## 2021-02-16 RX ORDER — ROPIVACAINE HYDROCHLORIDE 5 MG/ML
30 INJECTION, SOLUTION EPIDURAL; INFILTRATION; PERINEURAL AS NEEDED
Status: DISCONTINUED | OUTPATIENT
Start: 2021-02-16 | End: 2021-02-16 | Stop reason: HOSPADM

## 2021-02-16 RX ORDER — ONDANSETRON 2 MG/ML
INJECTION INTRAMUSCULAR; INTRAVENOUS AS NEEDED
Status: DISCONTINUED | OUTPATIENT
Start: 2021-02-16 | End: 2021-02-16 | Stop reason: HOSPADM

## 2021-02-16 RX ORDER — LIDOCAINE HYDROCHLORIDE 10 MG/ML
0.1 INJECTION, SOLUTION EPIDURAL; INFILTRATION; INTRACAUDAL; PERINEURAL AS NEEDED
Status: DISCONTINUED | OUTPATIENT
Start: 2021-02-16 | End: 2021-02-16 | Stop reason: HOSPADM

## 2021-02-16 RX ORDER — IBUPROFEN 600 MG/1
600 TABLET ORAL
Qty: 30 TAB | Refills: 1 | Status: SHIPPED | OUTPATIENT
Start: 2021-02-16

## 2021-02-16 RX ORDER — SODIUM CHLORIDE 0.9 % (FLUSH) 0.9 %
5-40 SYRINGE (ML) INJECTION EVERY 8 HOURS
Status: DISCONTINUED | OUTPATIENT
Start: 2021-02-16 | End: 2021-02-16

## 2021-02-16 RX ORDER — ACETAMINOPHEN 325 MG/1
650 TABLET ORAL ONCE
Status: COMPLETED | OUTPATIENT
Start: 2021-02-16 | End: 2021-02-16

## 2021-02-16 RX ORDER — SODIUM CHLORIDE 0.9 % (FLUSH) 0.9 %
5-40 SYRINGE (ML) INJECTION AS NEEDED
Status: DISCONTINUED | OUTPATIENT
Start: 2021-02-16 | End: 2021-02-17 | Stop reason: HOSPADM

## 2021-02-16 RX ORDER — LIDOCAINE HYDROCHLORIDE 20 MG/ML
INJECTION, SOLUTION EPIDURAL; INFILTRATION; INTRACAUDAL; PERINEURAL AS NEEDED
Status: DISCONTINUED | OUTPATIENT
Start: 2021-02-16 | End: 2021-02-16 | Stop reason: HOSPADM

## 2021-02-16 RX ORDER — OXYCODONE HYDROCHLORIDE 5 MG/1
5 TABLET ORAL AS NEEDED
Status: DISCONTINUED | OUTPATIENT
Start: 2021-02-16 | End: 2021-02-16 | Stop reason: HOSPADM

## 2021-02-16 RX ORDER — MORPHINE SULFATE 2 MG/ML
1 INJECTION, SOLUTION INTRAMUSCULAR; INTRAVENOUS
Status: DISCONTINUED | OUTPATIENT
Start: 2021-02-16 | End: 2021-02-17 | Stop reason: HOSPADM

## 2021-02-16 RX ORDER — BUPIVACAINE HYDROCHLORIDE 5 MG/ML
INJECTION, SOLUTION EPIDURAL; INTRACAUDAL AS NEEDED
Status: DISCONTINUED | OUTPATIENT
Start: 2021-02-16 | End: 2021-02-16 | Stop reason: HOSPADM

## 2021-02-16 RX ORDER — FENTANYL CITRATE 50 UG/ML
25 INJECTION, SOLUTION INTRAMUSCULAR; INTRAVENOUS
Status: DISCONTINUED | OUTPATIENT
Start: 2021-02-16 | End: 2021-02-16 | Stop reason: HOSPADM

## 2021-02-16 RX ORDER — SODIUM CHLORIDE, SODIUM LACTATE, POTASSIUM CHLORIDE, CALCIUM CHLORIDE 600; 310; 30; 20 MG/100ML; MG/100ML; MG/100ML; MG/100ML
100 INJECTION, SOLUTION INTRAVENOUS CONTINUOUS
Status: DISCONTINUED | OUTPATIENT
Start: 2021-02-16 | End: 2021-02-16 | Stop reason: HOSPADM

## 2021-02-16 RX ORDER — DEXAMETHASONE SODIUM PHOSPHATE 4 MG/ML
INJECTION, SOLUTION INTRA-ARTICULAR; INTRALESIONAL; INTRAMUSCULAR; INTRAVENOUS; SOFT TISSUE AS NEEDED
Status: DISCONTINUED | OUTPATIENT
Start: 2021-02-16 | End: 2021-02-16 | Stop reason: HOSPADM

## 2021-02-16 RX ORDER — FENTANYL CITRATE 50 UG/ML
50 INJECTION, SOLUTION INTRAMUSCULAR; INTRAVENOUS AS NEEDED
Status: DISCONTINUED | OUTPATIENT
Start: 2021-02-16 | End: 2021-02-16 | Stop reason: HOSPADM

## 2021-02-16 RX ORDER — MORPHINE SULFATE 10 MG/ML
2 INJECTION, SOLUTION INTRAMUSCULAR; INTRAVENOUS
Status: DISCONTINUED | OUTPATIENT
Start: 2021-02-16 | End: 2021-02-16 | Stop reason: HOSPADM

## 2021-02-16 RX ORDER — METFORMIN HYDROCHLORIDE 1000 MG/1
1000 TABLET ORAL DAILY
COMMUNITY

## 2021-02-16 RX ORDER — ROCURONIUM BROMIDE 10 MG/ML
INJECTION, SOLUTION INTRAVENOUS AS NEEDED
Status: DISCONTINUED | OUTPATIENT
Start: 2021-02-16 | End: 2021-02-16 | Stop reason: HOSPADM

## 2021-02-16 RX ORDER — PROPOFOL 10 MG/ML
INJECTION, EMULSION INTRAVENOUS AS NEEDED
Status: DISCONTINUED | OUTPATIENT
Start: 2021-02-16 | End: 2021-02-16 | Stop reason: HOSPADM

## 2021-02-16 RX ORDER — DEXTROSE, SODIUM CHLORIDE, SODIUM LACTATE, POTASSIUM CHLORIDE, AND CALCIUM CHLORIDE 5; .6; .31; .03; .02 G/100ML; G/100ML; G/100ML; G/100ML; G/100ML
150 INJECTION, SOLUTION INTRAVENOUS CONTINUOUS
Status: DISCONTINUED | OUTPATIENT
Start: 2021-02-16 | End: 2021-02-17 | Stop reason: HOSPADM

## 2021-02-16 RX ORDER — SODIUM CHLORIDE, SODIUM LACTATE, POTASSIUM CHLORIDE, CALCIUM CHLORIDE 600; 310; 30; 20 MG/100ML; MG/100ML; MG/100ML; MG/100ML
INJECTION, SOLUTION INTRAVENOUS
Status: DISCONTINUED | OUTPATIENT
Start: 2021-02-16 | End: 2021-02-16 | Stop reason: HOSPADM

## 2021-02-16 RX ORDER — ONDANSETRON 2 MG/ML
4 INJECTION INTRAMUSCULAR; INTRAVENOUS
Status: DISCONTINUED | OUTPATIENT
Start: 2021-02-16 | End: 2021-02-17 | Stop reason: HOSPADM

## 2021-02-16 RX ORDER — MIDAZOLAM HYDROCHLORIDE 1 MG/ML
INJECTION, SOLUTION INTRAMUSCULAR; INTRAVENOUS AS NEEDED
Status: DISCONTINUED | OUTPATIENT
Start: 2021-02-16 | End: 2021-02-16 | Stop reason: HOSPADM

## 2021-02-16 RX ORDER — ENOXAPARIN SODIUM 100 MG/ML
40 INJECTION SUBCUTANEOUS ONCE
Status: COMPLETED | OUTPATIENT
Start: 2021-02-16 | End: 2021-02-16

## 2021-02-16 RX ORDER — OXYCODONE AND ACETAMINOPHEN 5; 325 MG/1; MG/1
2 TABLET ORAL
Status: DISCONTINUED | OUTPATIENT
Start: 2021-02-16 | End: 2021-02-17 | Stop reason: HOSPADM

## 2021-02-16 RX ORDER — SODIUM CHLORIDE, SODIUM LACTATE, POTASSIUM CHLORIDE, CALCIUM CHLORIDE 600; 310; 30; 20 MG/100ML; MG/100ML; MG/100ML; MG/100ML
25 INJECTION, SOLUTION INTRAVENOUS CONTINUOUS
Status: DISCONTINUED | OUTPATIENT
Start: 2021-02-16 | End: 2021-02-16 | Stop reason: HOSPADM

## 2021-02-16 RX ORDER — OXYCODONE AND ACETAMINOPHEN 5; 325 MG/1; MG/1
1 TABLET ORAL
Qty: 28 TAB | Refills: 0 | Status: SHIPPED | OUTPATIENT
Start: 2021-02-16 | End: 2021-02-23

## 2021-02-16 RX ORDER — SODIUM CHLORIDE 0.9 % (FLUSH) 0.9 %
5-40 SYRINGE (ML) INJECTION AS NEEDED
Status: DISCONTINUED | OUTPATIENT
Start: 2021-02-16 | End: 2021-02-16 | Stop reason: HOSPADM

## 2021-02-16 RX ORDER — HYDROMORPHONE HYDROCHLORIDE 1 MG/ML
0.2 INJECTION, SOLUTION INTRAMUSCULAR; INTRAVENOUS; SUBCUTANEOUS
Status: DISCONTINUED | OUTPATIENT
Start: 2021-02-16 | End: 2021-02-16 | Stop reason: HOSPADM

## 2021-02-16 RX ORDER — FACIAL-BODY WIPES
10 EACH TOPICAL DAILY
Status: DISCONTINUED | OUTPATIENT
Start: 2021-02-17 | End: 2021-02-17 | Stop reason: HOSPADM

## 2021-02-16 RX ORDER — SODIUM CHLORIDE 0.9 % (FLUSH) 0.9 %
5-40 SYRINGE (ML) INJECTION AS NEEDED
Status: DISCONTINUED | OUTPATIENT
Start: 2021-02-16 | End: 2021-02-16

## 2021-02-16 RX ADMIN — ACETAMINOPHEN 650 MG: 325 TABLET ORAL at 06:43

## 2021-02-16 RX ADMIN — OXYCODONE AND ACETAMINOPHEN 2 TABLET: 5; 325 TABLET ORAL at 12:30

## 2021-02-16 RX ADMIN — SUGAMMADEX 100 MG: 100 INJECTION, SOLUTION INTRAVENOUS at 09:37

## 2021-02-16 RX ADMIN — DEXAMETHASONE SODIUM PHOSPHATE 8 MG: 4 INJECTION, SOLUTION INTRAMUSCULAR; INTRAVENOUS at 07:41

## 2021-02-16 RX ADMIN — FENTANYL CITRATE 100 MCG: 50 INJECTION, SOLUTION INTRAMUSCULAR; INTRAVENOUS at 08:46

## 2021-02-16 RX ADMIN — FENTANYL CITRATE 100 MCG: 50 INJECTION, SOLUTION INTRAMUSCULAR; INTRAVENOUS at 07:32

## 2021-02-16 RX ADMIN — METRONIDAZOLE 500 MG: 500 SOLUTION INTRAVENOUS at 07:56

## 2021-02-16 RX ADMIN — SODIUM CHLORIDE, POTASSIUM CHLORIDE, SODIUM LACTATE AND CALCIUM CHLORIDE 25 ML/HR: 600; 310; 30; 20 INJECTION, SOLUTION INTRAVENOUS at 06:54

## 2021-02-16 RX ADMIN — DEXMEDETOMIDINE HYDROCHLORIDE 10 MCG: 100 INJECTION, SOLUTION, CONCENTRATE INTRAVENOUS at 09:42

## 2021-02-16 RX ADMIN — MIDAZOLAM 3 MG: 1 INJECTION INTRAMUSCULAR; INTRAVENOUS at 07:29

## 2021-02-16 RX ADMIN — WATER 2 G: 1 INJECTION INTRAMUSCULAR; INTRAVENOUS; SUBCUTANEOUS at 07:50

## 2021-02-16 RX ADMIN — ENOXAPARIN SODIUM 40 MG: 40 INJECTION SUBCUTANEOUS at 06:43

## 2021-02-16 RX ADMIN — FENTANYL CITRATE 25 MCG: 50 INJECTION, SOLUTION INTRAMUSCULAR; INTRAVENOUS at 10:43

## 2021-02-16 RX ADMIN — SUCCINYLCHOLINE CHLORIDE 160 MG: 20 INJECTION, SOLUTION INTRAMUSCULAR; INTRAVENOUS at 07:32

## 2021-02-16 RX ADMIN — ROCURONIUM BROMIDE 20 MG: 10 SOLUTION INTRAVENOUS at 09:05

## 2021-02-16 RX ADMIN — SODIUM CHLORIDE, SODIUM LACTATE, POTASSIUM CHLORIDE, CALCIUM CHLORIDE AND DEXTROSE MONOHYDRATE 150 ML/HR: 5; 600; 310; 30; 20 INJECTION, SOLUTION INTRAVENOUS at 16:12

## 2021-02-16 RX ADMIN — SODIUM CHLORIDE, POTASSIUM CHLORIDE, SODIUM LACTATE AND CALCIUM CHLORIDE: 600; 310; 30; 20 INJECTION, SOLUTION INTRAVENOUS at 07:10

## 2021-02-16 RX ADMIN — ONDANSETRON HYDROCHLORIDE 4 MG: 2 INJECTION, SOLUTION INTRAMUSCULAR; INTRAVENOUS at 09:27

## 2021-02-16 RX ADMIN — ROCURONIUM BROMIDE 40 MG: 10 SOLUTION INTRAVENOUS at 07:50

## 2021-02-16 RX ADMIN — PROPOFOL 200 MG: 10 INJECTION, EMULSION INTRAVENOUS at 07:32

## 2021-02-16 RX ADMIN — LIDOCAINE HYDROCHLORIDE 60 MG: 20 INJECTION, SOLUTION EPIDURAL; INFILTRATION; INTRACAUDAL; PERINEURAL at 07:32

## 2021-02-16 RX ADMIN — KETOROLAC TROMETHAMINE 30 MG: 30 INJECTION, SOLUTION INTRAMUSCULAR at 11:14

## 2021-02-16 RX ADMIN — DEXMEDETOMIDINE HYDROCHLORIDE 10 MCG: 100 INJECTION, SOLUTION, CONCENTRATE INTRAVENOUS at 08:44

## 2021-02-16 RX ADMIN — Medication 5 ML: at 22:00

## 2021-02-16 RX ADMIN — FENTANYL CITRATE 25 MCG: 50 INJECTION, SOLUTION INTRAMUSCULAR; INTRAVENOUS at 10:30

## 2021-02-16 RX ADMIN — ROCURONIUM BROMIDE 10 MG: 10 SOLUTION INTRAVENOUS at 07:32

## 2021-02-16 NOTE — OP NOTES
[address] 
OPERATIVE REPORT Name:  Reynold Murray 
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 PROCEDURE PERFORMED: 
1. Hysteroscopy. 2.  Da Dennis robotic myomectomy. 3.  Bilateral ovarian cystectomy. 4.  Chromopertubation. SURGEON:  Keshav Ibrahim MD 
 
ASSISTANT:  Surgical assist. 
 
ANESTHESIA:  General. 
 
COMPLICATIONS:  None. SPECIMENS REMOVED:  ***. IMPLANTS:  None. ESTIMATED BLOOD LOSS:  ***. IV FLUIDS:  700 mL. URINE OUTPUT:  Please refer to nursing notes. FINDINGS:  Anterior 1- to- 2-cm subserosal fibroid. A posterior left lower intramural fibroid approximately 3 to 4 cm in size. Bilateral tubal patency. Normal hysteroscopic appearance of the endometrial cavity. INDICATIONS FOR PROCEDURE:  The patient is a pleasant 25-year-old female who came to the attention of GYN Services secondary to pelvic pain and infertility. The patient had ultrasound findings which were significant for a 5-cm pedunculated fibroid. The patient also underwent an HSG, which was significant for findings of bilateral tubal occlusion. Secondary to these findings, the patient desired to undergo robotic myomectomy, chromopertubation with possible bilateral salpingectomy if the tubes appear to be abnormal.  Possible need for further interventions and hysteroscopy was discussed. The patient understood the risks and benefits of these procedures. She understood the risks could include bleeding, infection, risk of damage to nearby pelvic structures such as bowel or bladder, arteries, nerves, ureters etc.  Risk of scar tissue formation, risk of need for possible  section, risk for continued infertility, risk for continued pelvic pain. The patient understood these risks and signed her consent prior to being brought back to the operating room. PROCEDURE:  The patient was brought back to the operating room in a stable condition. The patient was placed on the operating room table in the supine position. General anesthesia was then administered. The patient was then placed in the dorsal low lithotomy position with bilateral Nitin stirrups. The patient was then prepped and draped in normal sterile fashion. A time-out was then performed. A Richard catheter was placed inside the patient's bladder draining clear yellow urine. An open-sided speculum was placed inside the patient's vagina. The cervix was visualized. The cervix appeared to be normal.  A single-tooth tenaculum was then placed on the anterior lip of the patient's cervix. The cervix was then dilated with Daren Malling dilators until a hysteroscope was able to be inserted without difficulty. Normal saline was the distending medium. Bilateral tubal ostia were visualized. The endometrial cavity appeared to be normal.  This terminated hysteroscopy. All instruments were removed from the vagina after a medium sized diagnostic V-Care was placed into the uterus under normal sterile fashion. Methylene blue was then attached to the distal end of the uterine manipulator for later in the procedure. Gloves were changed. Attention was then turned to the patient's abdomen. Prior to making any incisions, 0.5% plain Marcaine was injected. An infraumbilical incision was then made. Through this incision, a Veress needle was inserted. Correct placement as the abdominal cavity was confirmed with the saline drop test.  The abdomen was then insufflated with approximately 2 L of carbon dioxide. An 8-mm da Dennis trocar was then inserted. Correct placement was confirmed visually. An anatomical survey was then undertaken. The fallopian tubes appeared to be normal.  Bilateral ovaries appeared to be enlarged with a benign appearing cyst.  The uterus appeared to be slightly boggy in texture and enlarged. There was a 1-2 cm anterior pedunculated fibroid and then in the lower posterior left a 3-4 cm fibroid approaching the cervical region. The rest of the abdomen appeared to be normal. 
 
Attention was first turned to chromopertubation. With injection of methylene blue, bilateral spillage was seen. Attention was turned to myomectomy. Vasopressin was injected prior to making any incisions on the uterus. The vasopressin was injected anteriorly at the level of the 2-cm pedunculated fibroid. This fibroid was then shaved off with a curved Henson scissors at the level of the stalk. The specimen was then removed from the abdomen. The base of the incision was then coagulated with adequate hemostasis. Attention was then turned to the posterior lower left portion of the uterus. A 3- to- 4-cm fibroid was palpated. An incision was made above this fibroid after injection of vasopressin. The fibroid was then enucleated using the curved Henson scissors. A tenaculum was then used to grasp the fibroid and help to rotate the fibroid positionally out of the myometrium. Once the fibroid was enucleated, the operative site was then coagulated. A 2-0 Stratafix suture was then used to close the myometrium. The suture was then cut and a 3-0 Stratafix was then used to close the subserosal area. Secondary to the position of the fibroid being in the lower portion of the uterus, substantial uterine manipulation was needed above and below to visualize this area. Therefore there were multiple areas from manipulating the uterus where the serosa was slightly abraded. These areas were then coagulated with adequate hemostasis. The incision on the uterus was slightly oozy. Secondary to this, a decision was made to keep the patient overnight. Surgicel powder was placed over this area and Interceed was wrapped around the operative site. The patient also had bilateral ovarian cysts. These appeared to be simple appearing cysts. These were ruptured with a simple cystotomy and clear fluid was drained from the cysts. Any bleeding from the incision sites from the ovaries was controlled with the cautery of the curved Henson scissors instrument. The fibroid was then placed in a EndoCatch bag and then removed from the right lower quadrant incision. The patient had two upper quadrant incisions, one on the left upper quadrant and one on the right and then a left lower quadrant incision. All of each incisions were placed under direct visualization atraumatically. Each port size was an 8-mm da Dennis trocar. These were placed at the beginning of the procedure. The patient was placed in steep Trendelenburg during the case.  After the procedure was terminated, the pelvis was copiously irrigated. 
 
The patient tolerated the procedure well.  All OR counts were reportedly correct and the patient will be taken back to the recovery area in stable condition. 
 
 
 
AMBER WALLS MD 
 
 
[id number] 
D:  02/16/2021 10:12 
T:  02/16/2021 14:02 
JOB #:  [id number]

## 2021-02-16 NOTE — ANESTHESIA POSTPROCEDURE EVALUATION
Post-Anesthesia Evaluation and Assessment    Patient: Diego Carmona MRN: [id number]  SSN: [id number]    YOB: 1990  Age: 26 y.o. Sex: female      I have evaluated the patient and they are stable and ready for discharge from the PACU. Cardiovascular Function/Vital Signs  Visit Vitals  BP (!) 100/54   Pulse 62   Temp 37.1 °C (98.8 °F)   Resp 13   Ht 5' 4\" (1.626 m)   Wt 51.7 kg (113 lb 15 oz)   SpO2 99%   BMI 19.56 kg/m²       Patient is status post General anesthesia for Procedure(s):  DAVINCI MYOMECTOMY, CHROMPERTUBATION, BILATERAL OVARIAN CYSTECTOMY  HYSTEROSCOPY. Nausea/Vomiting: None    Postoperative hydration reviewed and adequate. Pain:  Pain Scale 1: Numeric (0 - 10) (02/16/21 1034)  Pain Intensity 1: 6 (02/16/21 1034)   Managed    Neurological Status:   Neuro (WDL): Within Defined Limits (02/16/21 0956)   At baseline    Mental Status, Level of Consciousness: Alert and  oriented to person, place, and time    Pulmonary Status:   O2 Device: Nasal cannula (02/16/21 0956)   Adequate oxygenation and airway patent    Complications related to anesthesia: None    Post-anesthesia assessment completed. No concerns    Signed By: Ana Kebede MD     February 16, 2021              Procedure(s):  DAVINCI MYOMECTOMY, CHROMPERTUBATION, BILATERAL OVARIAN CYSTECTOMY  HYSTEROSCOPY. general    <BSHSIANPOST>    INITIAL Post-op Vital signs:   Vitals Value Taken Time   /54 02/16/21 1030   Temp 37.1 °C (98.8 °F) 02/16/21 0956   Pulse 50 02/16/21 1034   Resp 23 02/16/21 1034   SpO2 100 % 02/16/21 1034   Vitals shown include unvalidated device data.

## 2021-02-16 NOTE — ANESTHESIA PREPROCEDURE EVALUATION
Relevant Problems   No relevant active problems       Anesthetic History   No history of anesthetic complications            Review of Systems / Medical History  Patient summary reviewed, nursing notes reviewed and pertinent labs reviewed    Pulmonary  Within defined limits                 Neuro/Psych         Psychiatric history     Cardiovascular  Within defined limits                Exercise tolerance: >4 METS     GI/Hepatic/Renal  Within defined limits              Endo/Other  Within defined limits           Other Findings   Comments: PCOS  Fibroids           Physical Exam    Airway  Mallampati: II  TM Distance: 4 - 6 cm  Neck ROM: normal range of motion   Mouth opening: Normal     Cardiovascular  Regular rate and rhythm,  S1 and S2 normal,  no murmur, click, rub, or gallop             Dental  No notable dental hx       Pulmonary  Breath sounds clear to auscultation               Abdominal  GI exam deferred       Other Findings            Anesthetic Plan    ASA: 2  Anesthesia type: general          Induction: Intravenous  Anesthetic plan and risks discussed with: Patient

## 2021-02-16 NOTE — H&P
Gynecology History and Physical    Name: Edmund Burton MRN: [id number] [id number]: [id number]    YOB: 1990  Age: 26 y.o. Sex: female       Subjective:      Chief complaint:  Infertility and Menorrhagia    Mamadou Albright is a 26 y.o.  female with a history of fibroids and infertility. Previous workup included Ultrasound which revealed fibroid(s) and a HSG showing bilateral tubal occlusion. Previous treatment measures included observation. She is admitted for Procedure(s) (LRB):  DAVINCI MYOMECTOMY, POSSIBLE MORCELLATION, CHROMPERTUBATION (N/A)  HYSTEROSCOPY OPERATIVE MORCELLATION (N/A). The current method of family planning is none. OB History    No obstetric history on file. Past Medical History:   Diagnosis Date    Depression     DEPRESSION, ANXIETY    H/O chest pain     NEG. CARDIAC WORKUP PER PATIENT    Heart murmur     Hypercholesterolemia     PCOS (polycystic ovarian syndrome) 2009    Uterine fibroid 2021     History reviewed. No pertinent surgical history. Social History     Occupational History    Not on file   Tobacco Use    Smoking status: Never Smoker    Smokeless tobacco: Never Used   Substance and Sexual Activity    Alcohol use:  Yes     Alcohol/week: 2.0 standard drinks     Types: 2 Glasses of wine per week    Drug use: No    Sexual activity: Yes     Partners: Male     Birth control/protection: Condom     Family History   Problem Relation Age of Onset    Heart Disease Mother         MI    Heart Disease Father 27         at age 45     Elevated Lipids Father     Thyroid Disease Sister     Elevated Lipids Sister     Anesth Problems Sister         DELAYED AWAKENING, PONV    Elevated Lipids Brother     Heart Disease Brother         ? congenital     Elevated Lipids Maternal Grandfather     Elevated Lipids Sister         Allergies   Allergen Reactions    Mushroom Nausea and Vomiting     Prior to Admission medications    Medication Sig Start Date End Date Taking? Authorizing Provider   metFORMIN (GLUCOPHAGE) 1,000 mg tablet Take 1,000 mg by mouth daily. At night   Yes Provider, Historical   spironolactone (ALDACTONE) 25 mg tablet Take 25 mg by mouth nightly. Yes Provider, Historical   cholecalciferol (Vitamin D3) 25 mcg (1,000 unit) cap Take 1,000 Units by mouth nightly. Yes Provider, Historical   atorvastatin (LIPITOR) 40 mg tablet Take 1 Tab by mouth daily. Patient taking differently: Take 40 mg by mouth nightly. 5/24/17  Yes Angela Brenner MD        Review of Systems:  A comprehensive review of systems was negative except for that written in the History of Present Illness. Objective:     Vitals:    02/16/21 0619   BP: (!) 141/77   Resp: 16   Temp: 98.4 °F (36.9 °C)   SpO2: 100%   Weight: 51.7 kg (113 lb 15 oz)   Height: 5' 4\" (1.626 m)       Physical Exam:  Patient without distress. Abdomen: soft, nontender  External Genitalia: normal general appearance  Rest of exam deferred for OR    Assessment:     Active Problems:    * No active hospital problems. *     Fibroids with infertility and menorrhagia    Plan:     Procedure(s) (LRB):  DAVINCI MYOMECTOMY, POSSIBLE MORCELLATION, CHROMPERTUBATION (N/A)  HYSTEROSCOPY OPERATIVE MORCELLATION (N/A)  Discussed the risks of surgery including the risks of bleeding, infection, deep vein thrombosis, and surgical injuries to internal organs including but not limited to the bowels, bladder, rectum, and female reproductive organs. The patient understands the risks; any and all questions were answered to the patient's satisfaction. 1.  Pt  Desires bilateral salpingectomy if tubes appear to be abnormal for better IVF success  2.   Added hysteroscopy to list of procedures in patient holding area

## 2021-02-16 NOTE — PROGRESS NOTES
TRANSFER - IN REPORT:    Verbal report received from PACU(name) on Ayanna Mao  being received from [facility]) for routine progression of care      Report consisted of patients Situation, Background, Assessment and   Recommendations(SBAR). Information from the following report(s) SBAR, Kardex, OR Summary, Procedure Summary, Intake/Output and MAR was reviewed with the receiving nurse. Opportunity for questions and clarification was provided. Assessment completed upon patients arrival to unit and care assumed.

## 2021-02-16 NOTE — ROUTINE PROCESS
Patient: Ayanna Mao MRN: [id number]  SSN: [id number] YOB: 1990  Age: 26 y.o. Sex: female Patient is status post Procedure(s): 
DAVINCI MYOMECTOMY, CHROMPERTUBATION, BILATERAL OVARIAN CYSTECTOMY HYSTEROSCOPY. Surgeon(s) and Role: * Amy Lopez MD - Primary Local/Dose/Irrigation:  See [facility] Peripheral IV 02/16/21 Left Hand (Active) Peripheral IV 02/16/21 Right Hand (Active) Airway - Endotracheal Tube 02/16/21 Oral (Active) Dressing/Packing:  Incision 02/16/21 Abdomen-Dressing/Treatment: Liquid /adhesive(Dermabond) (02/16/21 7212) Incision 02/16/21 Vagina-Dressing/Treatment: Salli Buggy (02/16/21 0641) Splint/Cast:  ] Other:  N/A

## 2021-02-16 NOTE — DISCHARGE INSTRUCTIONS
Patient Education        Laparoscopic Myomectomy: What to Expect at Home  Your Recovery  Laparoscopic myomectomy is surgery to remove one or more fibroids. Your doctor put a lighted tube (scope) and other tools through small cuts (incisions) in your belly. The doctor then removed the fibroids. After surgery, you may feel some pain in your belly for several days. Your belly may also be swollen. You may have a change in your bowel movements for a few days. And you may have some cramping for the first week. It's normal to also have some shoulder or back pain. This is caused by the gas your doctor put in your belly to help see your organs better. To help with pain, your doctor will prescribe medicines. You will need 2 or more weeks to fully recover. It's important not to lift anything heavy for about 1 week. Your doctor may talk to you about when you can have sex and when it's safe to try to become pregnant. You may have light bleeding for up to 8 weeks. You may have a brown or reddish brown vaginal discharge or spotting for a few weeks or until your first period. This is normal. Expect your first two periods to start early or late. They may be more painful or heavy than usual.  This care sheet gives you a general idea about how long it will take for you to recover. But each person recovers at a different pace. Follow the steps below to get better as quickly as possible. How can you care for yourself at home? Activity    · Rest when you feel tired.     · Be active. Walking is a good choice.     · Allow your body to heal. Don't move quickly or lift anything heavy until you are feeling better.     · Ask your doctor when you can have sex.     · Hold a pillow over your incisions when you cough or take deep breaths. This will support your belly and may help to decrease your pain.     · Do breathing exercises at home as instructed by your doctor. This will help prevent pneumonia.    Diet    · You can eat your normal diet. If your stomach is upset, try bland, low-fat foods like plain rice, broiled chicken, toast, and yogurt.     · If your bowel movements are not regular right after surgery, try to avoid constipation and straining. Drink plenty of water. Your doctor may suggest fiber, a stool softener, or a mild laxative. Medicines    · Be safe with medicines. Read and follow all instructions on the label. ? If the doctor gave you a prescription medicine for pain, take it as prescribed. ? If you are not taking a prescription pain medicine, ask your doctor if you can take an over-the-counter medicine.     · Your doctor will tell you if and when you can restart your medicines. He or she will also give you instructions about taking any new medicines.     · If you take aspirin or some other blood thinner, ask your doctor if and when to start taking it again. Make sure that you understand exactly what your doctor wants you to do. Incision care    · If you have strips of tape on the cut (incision) the doctor made, leave the tape on for a week or until it falls off.     · If you have skin adhesive on the incision, leave it on until it falls off. Skin adhesive is also called liquid stitches.     · Wash the area daily with warm, soapy water, and pat it dry. Don't use hydrogen peroxide or alcohol. They can slow healing.     · You may cover the area with a gauze bandage if it oozes fluid or rubs against clothing.     · Change the bandage every day.     · Keep the area clean and dry. Other instructions    · Wear loose, comfortable clothing. For a few weeks, avoid anything that puts pressure on your belly.     · You may have some light vaginal bleeding. Wear sanitary pads if needed. Do not douche or use tampons.     · You may want to use a heating pad on your belly to help with pain. Follow-up care is a key part of your treatment and safety. Be sure to make and go to all appointments, and call your doctor if you are having problems. It's also a good idea to know your test results and keep a list of the medicines you take. When should you call for help? Call 911 anytime you think you may need emergency care. For example, call if:    · You passed out (lost consciousness).     · You have chest pain, are short of breath, or cough up blood. Call your doctor now or seek immediate medical care if:    · You have pain that does not get better after you take pain medicine.     · You cannot pass stools or gas.     · You have vaginal discharge that has increased in amount or smells bad.     · You are sick to your stomach or cannot drink fluids.     · You have loose stitches, or your incision comes open.     · Bright red blood has soaked through the bandage over your incision.     · You have signs of infection, such as:  ? Increased pain, swelling, warmth, or redness. ? Red streaks leading from the incision. ? Pus draining from the incision. ? A fever.     · You have bright red vaginal bleeding that soaks one or more pads in an hour, or you have large clots.     · You have signs of a blood clot in your leg (called a deep vein thrombosis), such as:  ? Pain in your calf, back of the knee, thigh, or groin. ? Redness and swelling in your leg. Watch closely for changes in your health, and be sure to contact your doctor if you have any problems. Where can you learn more? Go to http://www.gray.com/  Enter A705 in the search box to learn more about \"Laparoscopic Myomectomy: What to Expect at Home. \"  Current as of: November 8, 2019               Content Version: 12.6  © 0059-3440 [employer]. Care instructions adapted under license by [employer] (which disclaims liability or warranty for this information).  If you have questions about a medical condition or this instruction, always ask your healthcare professional. [address] any warranty or liability for your use of this information. ______________________________________________________________________    Anesthesia Discharge Instructions    After general anesthesia or intervenous sedation, for 24 hours or while taking prescription Narcotics:  · Limit your activities  · Do not drive or operate hazardous machinery  · If you have not urinated within 8 hours after discharge, please contact your surgeon on call. · Do not make important personal or business decisions  · Do not drink alcoholic beverages    Report the following to your surgeon:  · Excessive pain, swelling, redness or odor of or around the surgical area  · Temperature over 100.5 degrees  · Nausea and vomiting lasting longer than 4 hours or if unable to take medication  · Any signs of decreased circulation or nerve impairment to extremity:  Change in color, persistent numbness, tingling, coldness or increased pain.   · Any questions

## 2021-02-16 NOTE — PROCEDURES
Brief Postoperative Note    Patient: Arminda Bess  YOB: 1990  MRN: [id number]    Date of Procedure: 2/16/2021     Pre-Op Diagnosis: PELVIC PAIN, UTERINE FIBROIDS, BILATERAL TUBAL OCCLUSION, INFERTILITY    Post-Op Diagnosis: S/p myomectomy, bilateral tubal patency    Procedure(s):  DAVINCI MYOMECTOMY, CHROMPERTUBATION, BILATERAL OVARIAN CYSTECTOMY  HYSTEROSCOPY    Surgeon(s):  Katarina Baldwin MD    Surgical Assistant: Surg Asst-1: Barby CESPEDES    Anesthesia: General     Estimated Blood Loss (mL):  914    Complications: none    Specimens:   ID Type Source Tests Collected by Time Destination   1 : Myomyoma Fresh Uterus  Katarina Baldwin MD 2/16/2021 0932 Pathology        Implants: * No implants in log *    Drains: * No LDAs found *    Findings: 1-2 cm anterior subserosal fibroid; 3-4 cm left posterior lower intramural fibroid; bilateral tubal patency, bilateral ovarian cyst    Electronically Signed by Nereida Andujar MD on 2/16/2021 at 10:02 AM

## 2021-02-16 NOTE — PROGRESS NOTES
Bedside and Verbal shift change report given to Miranda Mccabe RN and Vu Sarah [place] (oncoming nurse) by Teresa Tucker RN (offgoing nurse). Report included the following information SBAR, Kardex, Procedure Summary, Intake/Output, MAR and Recent Results. PAST MEDICAL HISTORY:  H/O impaired glucose tolerance     HLD (hyperlipidemia)     HTN (hypertension)

## 2021-02-17 VITALS
SYSTOLIC BLOOD PRESSURE: 117 MMHG | OXYGEN SATURATION: 99 % | BODY MASS INDEX: 19.45 KG/M2 | DIASTOLIC BLOOD PRESSURE: 63 MMHG | HEIGHT: 64 IN | HEART RATE: 88 BPM | WEIGHT: 113.94 LBS | RESPIRATION RATE: 16 BRPM | TEMPERATURE: 98.7 F

## 2021-02-17 LAB
ERYTHROCYTE [DISTWIDTH] IN BLOOD BY AUTOMATED COUNT: 12.3 % (ref 11.5–14.5)
HCT VFR BLD AUTO: 33.4 % (ref 35–47)
HGB BLD-MCNC: 11.4 G/DL (ref 11.5–16)
MCH RBC QN AUTO: 32.2 PG (ref 26–34)
MCHC RBC AUTO-ENTMCNC: 34.1 G/DL (ref 30–36.5)
MCV RBC AUTO: 94.4 FL (ref 80–99)
NRBC # BLD: 0 K/UL (ref 0–0.01)
NRBC BLD-RTO: 0 PER 100 WBC
PLATELET # BLD AUTO: 144 K/UL (ref 150–400)
PMV BLD AUTO: 11.4 FL (ref 8.9–12.9)
RBC # BLD AUTO: 3.54 M/UL (ref 3.8–5.2)
WBC # BLD AUTO: 11.6 K/UL (ref 3.6–11)

## 2021-02-17 PROCEDURE — 36415 COLL VENOUS BLD VENIPUNCTURE: CPT

## 2021-02-17 PROCEDURE — 99218 HC RM OBSERVATION: CPT

## 2021-02-17 PROCEDURE — 85027 COMPLETE CBC AUTOMATED: CPT

## 2021-02-17 RX ORDER — ACETAMINOPHEN 325 MG/1
650 TABLET ORAL
Status: DISCONTINUED | OUTPATIENT
Start: 2021-02-17 | End: 2021-02-17 | Stop reason: HOSPADM

## 2021-02-17 RX ADMIN — Medication 5 ML: at 06:00

## 2021-02-17 NOTE — PROGRESS NOTES
Bedside and Verbal shift change report given to Charlie Andre (oncoming nurse) by Shante Solis (offgoing nurse). Report included the following information SBAR, Kardex, Intake/Output, MAR and Recent Results. 0945: Discharge medications reviewed with patient and appropriate educational materials and side effects teaching were provided. I have reviewed discharge instructions with the patient. The patient verbalized understanding.       1000: pt left via wheelchair to discharge

## 2021-02-17 NOTE — PROGRESS NOTES
Problem: Falls - Risk of  Goal: *Absence of Falls  Description: Document Rox Kumar Fall Risk and appropriate interventions in the flowsheet.   Note: Fall Risk Interventions:            Medication Interventions: Teach patient to arise slowly

## 2021-02-17 NOTE — OP NOTES
[address]  OPERATIVE REPORT    Name:  Blaze Etienne  MR#:  [id number]  :  1990  ACCOUNT #:  [de-identified]  DATE OF SERVICE:  2021      PREOPERATIVE DIAGNOSES:  Bilateral tubal occlusion on hysterosalpingogram, uterine fibroids, infertility and pelvic pain. 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 PROCEDURE PERFORMED:  1. Hysteroscopy. 2.  Da Dennis robotic myomectomy. 3.  Bilateral ovarian cystectomy. 4.  Chromopertubation. SURGEON:  Honor Bamberger, MD    ASSISTANT:  Surgical assist, Hortensia Diaz    ANESTHESIA:  General.    COMPLICATIONS:  None. SPECIMENS REMOVED:  Leiomyomas    IMPLANTS:  None. ESTIMATED BLOOD LOSS:  100 mL. IV FLUIDS:  700 mL. URINE OUTPUT:  Please refer to nursing notes. FINDINGS:  Anterior 1- to- 2-cm subserosal fibroid. A posterior left lower intramural fibroid approximately 3 to 4 cm in size. Bilateral tubal patency. Normal hysteroscopic appearance of the endometrial cavity. INDICATIONS FOR PROCEDURE:  The patient is a pleasant 76-year-old female who came to the attention of GYN Services secondary to pelvic pain and infertility. The patient had ultrasound findings which were significant for a 5-cm pedunculated fibroid. The patient also underwent an HSG, which was significant for findings of bilateral tubal occlusion. Secondary to these findings, the patient desired to undergo robotic myomectomy, chromopertubation with possible bilateral salpingectomy if the tubes appeared to be abnormal.  Possible need for further interventions and hysteroscopy was discussed. The patient understood the risks and benefits of these procedures.   She understood the risks could include bleeding, infection, risk of damage to nearby pelvic structures such as bowel or bladder, arteries, nerves, ureters etc.  Risk of scar tissue formation, risk of need for possible  section for subsequent pregnancies, risk for continued infertility, risk for continued pelvic pain. The patient understood these risks and signed her consent prior to being brought back to the operating room. PROCEDURE:  The patient was brought back to the operating room in a stable condition. The patient was placed on the operating room table in the supine position. General anesthesia was then administered. The patient was then placed in the dorsal low lithotomy position with bilateral Nitin stirrups. The patient was then prepped and draped in normal sterile fashion. A time-out was then performed. A Richard catheter was placed inside the patient's bladder draining clear yellow urine. An open-sided speculum was placed inside the patient's vagina. The cervix was visualized. The cervix appeared to be normal.  A single-tooth tenaculum was then placed on the anterior lip of the patient's cervix. The cervix was then dilated with Paulina Taylor dilators until a hysteroscope was able to be inserted without difficulty. Normal saline was the distending medium. Bilateral tubal ostia were visualized. The endometrial cavity appeared to be normal.  This terminated hysteroscopy. All instruments were removed from the vagina after a medium sized diagnostic V-Care was placed into the uterus under normal sterile fashion. Methylene blue was then attached to the distal end of the uterine manipulator for later use in the procedure. Gloves were changed. Attention was then turned to the patient's abdomen. Prior to making any incisions, 0.5% plain Marcaine was injected. An infraumbilical incision was then made. Through this incision, a Veress needle was inserted.   Correct placement into the abdominal cavity was confirmed with the saline drop test.  The abdomen was then insufflated with approximately 2 L of carbon dioxide. An 8-mm da Dennis trocar was then inserted. Correct placement was confirmed visually. An anatomical survey was then undertaken. The fallopian tubes appeared to be normal.  Bilateral ovaries appeared to be enlarged with a benign appearing cysts. The uterus appeared to be slightly boggy in texture and enlarged. There was a 1-2 cm anterior pedunculated fibroid and then in the lower posterior left a 3-4 cm fibroid approaching the cervical region. The rest of the abdomen appeared to be normal.    The patient had two upper quadrant incisions, one on the left upper quadrant and one on the right and then a left lower quadrant incision and right lower quadrant incision. All trocars were placed under direct visualization atraumatically. Each port size was an 8-mm da Dennis trocar. The robot was then docked in a side dock position after the patient was placed in trendelenburg. The robot was set us so that a curved henson scissor instrument was in the right and a bipolar instrument was in the left. Attention was first turned to chromopertubation. With injection of methylene blue, bilateral spillage was seen. Attention was turned to myomectomy. Vasopressin was injected prior to making any incisions on the uterus. The vasopressin was injected anteriorly at the level of the 2-cm pedunculated fibroid. This fibroid was then ligated with a curved Henson scissors at the level of the stalk. The specimen was then removed from the abdomen. The base of the incision was then coagulated with adequate hemostasis. Attention was then turned to the posterior lower left portion of the uterus. A 3- to- 4-cm fibroid was palpated. An incision was made above this fibroid after injection of vasopressin. The fibroid was then enucleated using the curved Henson scissors. A tenaculum was then used to grasp the fibroid and help to rotate the fibroid positionally out of the myometrium.   Once the fibroid was enucleated, the operative site was then coagulated. A 2-0 Stratafix suture was then used to close the myometrium. The endometrium was not breached. The suture was then cut and a 3-0 Stratafix was then used to close the subserosal area. Secondary to the position of the fibroid being in the lower portion of the uterus, substantial uterine manipulation was needed above and below to visualize this area. Therefore there were multiple areas from manipulating the uterus where the serosa was slightly abraded. These areas were then coagulated with adequate hemostasis. The incision on the uterus was slightly oozy. Secondary to this, a decision was made to keep the patient overnight. Surgicel powder was placed over this area and Interceed was wrapped around the operative site. The patient also had bilateral ovarian cysts. These appeared to be simple appearing cysts. These were ruptured with a simple cystotomy and clear fluid was drained from the cysts. Any bleeding from the incision sites from the ovaries was controlled with the cautery of the curved Henson scissors instrument. The fibroid was then placed in a EndoCatch bag and then removed from the right lower quadrant incision. The pelvis was copiously irrigated. The patient tolerated the procedure well. All OR counts were reportedly correct and the patient will be taken back to the recovery area in stable condition.         MD AYANA [email or web address]/[email or web address]  D:  02/16/2021 10:12  T:  02/16/2021 14:02  JOB #:  [id number]

## 2021-02-17 NOTE — PROGRESS NOTES
Hospital follow-up PCP transitional care appointment has been scheduled with Dr. Horace Almonte for 2/22/2021 at 12:45PM. Pending patient discharge.      Vaishali Phillip, Care Management Specialist

## 2021-02-17 NOTE — PROGRESS NOTES
Gynecology Post Operative Note    Clay Dickson    Post-op day 1 from Procedure(s):  DAVINCI MYOMECTOMY, CHROMPERTUBATION, BILATERAL OVARIAN CYSTECTOMY  HYSTEROSCOPY  She is without significant complaints. Pain controlled on current medication. Voiding without difficulty. Patient is passing flatus. She is is tolerating her diet. Vitals:  Temp (24hrs), Av.5 °F (36.9 °C), Min:98.2 °F (36.8 °C), Max:99 °F (37.2 °C)     Visit Vitals  /69 (BP 1 Location: Right upper arm, BP Patient Position: At rest)   Pulse 83   Temp 98.4 °F (36.9 °C)   Resp 16   Ht 5' 4\" (1.626 m)   Wt 51.7 kg (113 lb 15 oz)   SpO2 99%   BMI 19.56 kg/m²           Intake and Output:   Current shift: No intake/output data recorded. Last 3 completed shifts: 02/15 1901 -  0700  In: 1387.5 [I.V.:1387.5]  Out:  [Urine:1950]      Exam:  Patient without distress. Lungs clear              Abdomen soft, bowel sounds present, expected tenderness. Incision dry and clean without erythema. Lower extremities are negative for swelling, cords, or tenderness.     Labs:   Lab Results   Component Value Date/Time    WBC 11.6 (H) 2021 05:24 AM    WBC 7.0 2021 10:32 AM    WBC 5.3 2017 08:44 AM    HGB 11.4 (L) 2021 05:24 AM    HGB 14.3 2021 10:32 AM    HGB 13.5 2017 08:44 AM    HCT 33.4 (L) 2021 05:24 AM    HCT 42.8 2021 10:32 AM    HCT 40.9 2017 08:44 AM    PLATELET 841 (L)  05:24 AM    PLATELET 310  10:32 AM    PLATELET 885  08:44 AM       Recent Results (from the past 24 hour(s))   CBC W/O DIFF    Collection Time: 21  5:24 AM   Result Value Ref Range    WBC 11.6 (H) 3.6 - 11.0 K/uL    RBC 3.54 (L) 3.80 - 5.20 M/uL    HGB 11.4 (L) 11.5 - 16.0 g/dL    HCT 33.4 (L) 35.0 - 47.0 %    MCV 94.4 80.0 - 99.0 FL    MCH 32.2 26.0 - 34.0 PG    MCHC 34.1 30.0 - 36.5 g/dL    RDW 12.3 11.5 - 14.5 %    PLATELET 552 (L) 591 - 400 K/uL    MPV 11.4 8.9 - 12.9 FL    NRBC 0.0 0  WBC    ABSOLUTE NRBC 0.00 0.00 - 0.01 K/uL       Assessment: Doing well post-op    Plan: Discharge home today with: Medications: percocet Follow up: 1 wk Diet: as tolerated Activity: no heavy lifting and pelvic rest

## 2022-03-19 PROBLEM — F40.231 SEVERE NEEDLE PHOBIA: Status: ACTIVE | Noted: 2017-04-25

## 2022-03-19 PROBLEM — Z86.59 HISTORY OF DEPRESSION: Status: ACTIVE | Noted: 2017-04-25

## 2022-03-19 PROBLEM — E78.00 HYPERCHOLESTEROLEMIA: Status: ACTIVE | Noted: 2017-04-25

## 2022-03-19 PROBLEM — E28.2 PCOS (POLYCYSTIC OVARIAN SYNDROME): Status: ACTIVE | Noted: 2017-06-22

## 2022-03-20 PROBLEM — D21.9 FIBROIDS: Status: ACTIVE | Noted: 2021-02-16

## 2022-03-20 PROBLEM — E78.01 FAMILIAL HYPERCHOLESTEREMIA: Status: ACTIVE | Noted: 2017-05-24

## 2023-05-23 RX ORDER — IBUPROFEN 600 MG/1
600 TABLET ORAL EVERY 6 HOURS PRN
COMMUNITY
Start: 2021-02-16

## 2023-05-23 RX ORDER — ATORVASTATIN CALCIUM 40 MG/1
40 TABLET, FILM COATED ORAL DAILY
COMMUNITY
Start: 2017-05-24

## 2023-05-23 RX ORDER — SPIRONOLACTONE 25 MG/1
25 TABLET ORAL NIGHTLY
COMMUNITY

## 2024-08-20 ENCOUNTER — HOSPITAL ENCOUNTER (OUTPATIENT)
Facility: HOSPITAL | Age: 34
Discharge: HOME | End: 2024-08-23
Payer: COMMERCIAL

## 2024-08-20 VITALS
DIASTOLIC BLOOD PRESSURE: 75 MMHG | SYSTOLIC BLOOD PRESSURE: 120 MMHG | WEIGHT: 120 LBS | HEART RATE: 73 BPM | BODY MASS INDEX: 20.49 KG/M2 | HEIGHT: 64 IN | TEMPERATURE: 98.4 F

## 2024-08-20 LAB
ANION GAP SERPL CALC-SCNC: 5 MMOL/L (ref 5–15)
BASOPHILS # BLD: 0 K/UL (ref 0–0.1)
BASOPHILS NFR BLD: 1 % (ref 0–1)
BUN SERPL-MCNC: 22 MG/DL (ref 6–20)
BUN/CREAT SERPL: 23 (ref 12–20)
CALCIUM SERPL-MCNC: 9.9 MG/DL (ref 8.5–10.1)
CHLORIDE SERPL-SCNC: 107 MMOL/L (ref 97–108)
CO2 SERPL-SCNC: 25 MMOL/L (ref 21–32)
CREAT SERPL-MCNC: 0.94 MG/DL (ref 0.55–1.02)
DIFFERENTIAL METHOD BLD: ABNORMAL
EOSINOPHIL # BLD: 0 K/UL (ref 0–0.4)
EOSINOPHIL NFR BLD: 0 % (ref 0–7)
ERYTHROCYTE [DISTWIDTH] IN BLOOD BY AUTOMATED COUNT: 17.1 % (ref 11.5–14.5)
GLUCOSE SERPL-MCNC: 93 MG/DL (ref 65–100)
HCT VFR BLD AUTO: 35.5 % (ref 35–47)
HGB BLD-MCNC: 11 G/DL (ref 11.5–16)
IMM GRANULOCYTES # BLD AUTO: 0 K/UL (ref 0–0.04)
IMM GRANULOCYTES NFR BLD AUTO: 0 % (ref 0–0.5)
LYMPHOCYTES # BLD: 2.5 K/UL (ref 0.8–3.5)
LYMPHOCYTES NFR BLD: 28 % (ref 12–49)
MCH RBC QN AUTO: 24.8 PG (ref 26–34)
MCHC RBC AUTO-ENTMCNC: 31 G/DL (ref 30–36.5)
MCV RBC AUTO: 80 FL (ref 80–99)
MONOCYTES # BLD: 0.7 K/UL (ref 0–1)
MONOCYTES NFR BLD: 8 % (ref 5–13)
NEUTS SEG # BLD: 5.5 K/UL (ref 1.8–8)
NEUTS SEG NFR BLD: 63 % (ref 32–75)
NRBC # BLD: 0 K/UL (ref 0–0.01)
NRBC BLD-RTO: 0 PER 100 WBC
PLATELET # BLD AUTO: 265 K/UL (ref 150–400)
PMV BLD AUTO: 10.9 FL (ref 8.9–12.9)
POTASSIUM SERPL-SCNC: 4.2 MMOL/L (ref 3.5–5.1)
RBC # BLD AUTO: 4.44 M/UL (ref 3.8–5.2)
SODIUM SERPL-SCNC: 137 MMOL/L (ref 136–145)
WBC # BLD AUTO: 8.8 K/UL (ref 3.6–11)

## 2024-08-20 PROCEDURE — 36415 COLL VENOUS BLD VENIPUNCTURE: CPT

## 2024-08-20 PROCEDURE — 85025 COMPLETE CBC W/AUTO DIFF WBC: CPT

## 2024-08-20 PROCEDURE — 80048 BASIC METABOLIC PNL TOTAL CA: CPT

## 2024-08-20 RX ORDER — MEGESTROL ACETATE 20 MG/1
20 TABLET ORAL NIGHTLY
COMMUNITY

## 2024-08-20 RX ORDER — SPIRONOLACTONE 100 MG/1
100 TABLET, FILM COATED ORAL DAILY
COMMUNITY

## 2024-08-20 NOTE — PERIOP NOTE
SURGICAL CONSENT OBTAINED IN PAT DEPT. AND ATTACHED TO CHART.   
will need to shower with CHG soap (Chorhexidine gluconate 4%) two times before surgery.    CHG soap (Hibiclens, Hex-A-Clens or store brand)  CHG soap will be provided at your Preadmission Testing (PAT) appointment.  If you do not have a PAT appointment before surgery, you may arrange to  CHG soap from our office or purchase CHG soap at a pharmacy, grocery or department store. You need to purchase TWO 4 ounce bottles to use for your 2 showers.    Steps to follow:  Wash your hair with your normal shampoo and your body with regular soap and rinse well to remove shampoo and soap from your skin.  Wet a clean washcloth and turn off the shower.  Put CHG soap on washcloth and apply to your entire body from the neck down. Do not use on your head, face or private parts(genitals). Do not use CHG soap on open sores, wounds or areas of skin irritation.  Wash your body gently for 5 minutes. Do not wash your skin too hard. This soap does not create lather. Pay special attention to your underarms and from your belly button to your feet.  Turn the shower back on and rinse well to get CHG soap off your body.  Pat your skin dry with a clean, dry towel. Do not apply lotions or moisturizer.  Put on clean clothes and sleep on fresh bed sheets and do not allow pets to sleep with you.    Shower with CHG soap 2 times before your surgery  The evening before your surgery  The morning of your surgery      Tips to help prevent infections after your surgery:  Protect your surgical wound from germs:  Hand washing is the most important thing you and your caregivers can do to prevent infections.  Keep your bandage clean and dry!  Do not touch your surgical wound.  Use clean, freshly washed towels and washcloths every time you shower; do not share bath linens with others.  Until your surgical wound is healed, wear clothing and sleep on bed linens that are clean.  Do not allow pets to sleep in your bed with you or touch your surgical

## 2024-08-30 ENCOUNTER — ANESTHESIA EVENT (OUTPATIENT)
Facility: HOSPITAL | Age: 34
End: 2024-08-30
Payer: COMMERCIAL

## 2024-09-03 ENCOUNTER — ANESTHESIA (OUTPATIENT)
Facility: HOSPITAL | Age: 34
End: 2024-09-03
Payer: COMMERCIAL

## 2024-09-03 ENCOUNTER — HOSPITAL ENCOUNTER (OUTPATIENT)
Facility: HOSPITAL | Age: 34
Setting detail: OUTPATIENT SURGERY
Discharge: HOME | End: 2024-09-03
Attending: OBSTETRICS & GYNECOLOGY | Admitting: OBSTETRICS & GYNECOLOGY
Payer: COMMERCIAL

## 2024-09-03 VITALS
OXYGEN SATURATION: 100 % | DIASTOLIC BLOOD PRESSURE: 74 MMHG | TEMPERATURE: 97.6 F | BODY MASS INDEX: 20.49 KG/M2 | HEIGHT: 64 IN | RESPIRATION RATE: 19 BRPM | WEIGHT: 120 LBS | HEART RATE: 83 BPM | SYSTOLIC BLOOD PRESSURE: 138 MMHG

## 2024-09-03 DIAGNOSIS — Z90.710 S/P HYSTERECTOMY: Primary | ICD-10-CM

## 2024-09-03 LAB — HCG UR QL: NEGATIVE

## 2024-09-03 PROCEDURE — 7100000000 HC PACU RECOVERY - FIRST 15 MIN: Performed by: OBSTETRICS & GYNECOLOGY

## 2024-09-03 PROCEDURE — 2580000003 HC RX 258: Performed by: OBSTETRICS & GYNECOLOGY

## 2024-09-03 PROCEDURE — 6370000000 HC RX 637 (ALT 250 FOR IP): Performed by: ANESTHESIOLOGY

## 2024-09-03 PROCEDURE — 3700000001 HC ADD 15 MINUTES (ANESTHESIA): Performed by: OBSTETRICS & GYNECOLOGY

## 2024-09-03 PROCEDURE — 2580000003 HC RX 258: Performed by: ANESTHESIOLOGY

## 2024-09-03 PROCEDURE — 6360000002 HC RX W HCPCS: Performed by: NURSE ANESTHETIST, CERTIFIED REGISTERED

## 2024-09-03 PROCEDURE — 3600000009 HC SURGERY ROBOT BASE: Performed by: OBSTETRICS & GYNECOLOGY

## 2024-09-03 PROCEDURE — 6360000002 HC RX W HCPCS: Performed by: OBSTETRICS & GYNECOLOGY

## 2024-09-03 PROCEDURE — 2709999900 HC NON-CHARGEABLE SUPPLY: Performed by: OBSTETRICS & GYNECOLOGY

## 2024-09-03 PROCEDURE — 2500000003 HC RX 250 WO HCPCS: Performed by: NURSE ANESTHETIST, CERTIFIED REGISTERED

## 2024-09-03 PROCEDURE — 88307 TISSUE EXAM BY PATHOLOGIST: CPT

## 2024-09-03 PROCEDURE — 6360000002 HC RX W HCPCS

## 2024-09-03 PROCEDURE — 7100000001 HC PACU RECOVERY - ADDTL 15 MIN: Performed by: OBSTETRICS & GYNECOLOGY

## 2024-09-03 PROCEDURE — 81025 URINE PREGNANCY TEST: CPT

## 2024-09-03 PROCEDURE — S2900 ROBOTIC SURGICAL SYSTEM: HCPCS | Performed by: OBSTETRICS & GYNECOLOGY

## 2024-09-03 PROCEDURE — 6360000002 HC RX W HCPCS: Performed by: ANESTHESIOLOGY

## 2024-09-03 PROCEDURE — 7100000010 HC PHASE II RECOVERY - FIRST 15 MIN: Performed by: OBSTETRICS & GYNECOLOGY

## 2024-09-03 PROCEDURE — 3600000019 HC SURGERY ROBOT ADDTL 15MIN: Performed by: OBSTETRICS & GYNECOLOGY

## 2024-09-03 PROCEDURE — 3700000000 HC ANESTHESIA ATTENDED CARE: Performed by: OBSTETRICS & GYNECOLOGY

## 2024-09-03 RX ORDER — SODIUM CHLORIDE 0.9 % (FLUSH) 0.9 %
5-40 SYRINGE (ML) INJECTION PRN
Status: DISCONTINUED | OUTPATIENT
Start: 2024-09-03 | End: 2024-09-03 | Stop reason: HOSPADM

## 2024-09-03 RX ORDER — OXYCODONE HYDROCHLORIDE 5 MG/1
5 TABLET ORAL
Status: COMPLETED | OUTPATIENT
Start: 2024-09-03 | End: 2024-09-03

## 2024-09-03 RX ORDER — FENTANYL CITRATE 50 UG/ML
INJECTION, SOLUTION INTRAMUSCULAR; INTRAVENOUS
Status: COMPLETED
Start: 2024-09-03 | End: 2024-09-03

## 2024-09-03 RX ORDER — PROPOFOL 10 MG/ML
INJECTION, EMULSION INTRAVENOUS PRN
Status: DISCONTINUED | OUTPATIENT
Start: 2024-09-03 | End: 2024-09-03 | Stop reason: SDUPTHER

## 2024-09-03 RX ORDER — MIDAZOLAM HYDROCHLORIDE 1 MG/ML
INJECTION INTRAMUSCULAR; INTRAVENOUS PRN
Status: DISCONTINUED | OUTPATIENT
Start: 2024-09-03 | End: 2024-09-03 | Stop reason: SDUPTHER

## 2024-09-03 RX ORDER — ROCURONIUM BROMIDE 10 MG/ML
INJECTION, SOLUTION INTRAVENOUS PRN
Status: DISCONTINUED | OUTPATIENT
Start: 2024-09-03 | End: 2024-09-03 | Stop reason: SDUPTHER

## 2024-09-03 RX ORDER — ONDANSETRON 2 MG/ML
4 INJECTION INTRAMUSCULAR; INTRAVENOUS AS NEEDED
Status: DISCONTINUED | OUTPATIENT
Start: 2024-09-03 | End: 2024-09-03 | Stop reason: HOSPADM

## 2024-09-03 RX ORDER — ONDANSETRON 2 MG/ML
4 INJECTION INTRAMUSCULAR; INTRAVENOUS
Status: DISCONTINUED | OUTPATIENT
Start: 2024-09-03 | End: 2024-09-03 | Stop reason: HOSPADM

## 2024-09-03 RX ORDER — ACETAMINOPHEN 500 MG
1000 TABLET ORAL ONCE
Status: COMPLETED | OUTPATIENT
Start: 2024-09-03 | End: 2024-09-03

## 2024-09-03 RX ORDER — FENTANYL CITRATE 50 UG/ML
100 INJECTION, SOLUTION INTRAMUSCULAR; INTRAVENOUS
Status: DISCONTINUED | OUTPATIENT
Start: 2024-09-03 | End: 2024-09-03 | Stop reason: HOSPADM

## 2024-09-03 RX ORDER — EPHEDRINE SULFATE 50 MG/ML
INJECTION INTRAVENOUS PRN
Status: DISCONTINUED | OUTPATIENT
Start: 2024-09-03 | End: 2024-09-03 | Stop reason: SDUPTHER

## 2024-09-03 RX ORDER — FENTANYL CITRATE 50 UG/ML
25 INJECTION, SOLUTION INTRAMUSCULAR; INTRAVENOUS EVERY 5 MIN PRN
Status: DISCONTINUED | OUTPATIENT
Start: 2024-09-03 | End: 2024-09-03 | Stop reason: HOSPADM

## 2024-09-03 RX ORDER — SUCCINYLCHOLINE/SOD CL,ISO/PF 200MG/10ML
SYRINGE (ML) INTRAVENOUS PRN
Status: DISCONTINUED | OUTPATIENT
Start: 2024-09-03 | End: 2024-09-03 | Stop reason: SDUPTHER

## 2024-09-03 RX ORDER — SODIUM CHLORIDE 9 MG/ML
INJECTION, SOLUTION INTRAVENOUS PRN
Status: DISCONTINUED | OUTPATIENT
Start: 2024-09-03 | End: 2024-09-03 | Stop reason: HOSPADM

## 2024-09-03 RX ORDER — PHENYLEPHRINE HCL IN 0.9% NACL 0.4MG/10ML
SYRINGE (ML) INTRAVENOUS PRN
Status: DISCONTINUED | OUTPATIENT
Start: 2024-09-03 | End: 2024-09-03 | Stop reason: SDUPTHER

## 2024-09-03 RX ORDER — DEXAMETHASONE SODIUM PHOSPHATE 4 MG/ML
INJECTION, SOLUTION INTRA-ARTICULAR; INTRALESIONAL; INTRAMUSCULAR; INTRAVENOUS; SOFT TISSUE PRN
Status: DISCONTINUED | OUTPATIENT
Start: 2024-09-03 | End: 2024-09-03 | Stop reason: SDUPTHER

## 2024-09-03 RX ORDER — ONDANSETRON 2 MG/ML
INJECTION INTRAMUSCULAR; INTRAVENOUS PRN
Status: DISCONTINUED | OUTPATIENT
Start: 2024-09-03 | End: 2024-09-03 | Stop reason: SDUPTHER

## 2024-09-03 RX ORDER — GLYCOPYRROLATE 0.2 MG/ML
INJECTION, SOLUTION INTRAMUSCULAR; INTRAVENOUS PRN
Status: DISCONTINUED | OUTPATIENT
Start: 2024-09-03 | End: 2024-09-03 | Stop reason: SDUPTHER

## 2024-09-03 RX ORDER — SODIUM CHLORIDE, SODIUM LACTATE, POTASSIUM CHLORIDE, CALCIUM CHLORIDE 600; 310; 30; 20 MG/100ML; MG/100ML; MG/100ML; MG/100ML
INJECTION, SOLUTION INTRAVENOUS CONTINUOUS
Status: DISCONTINUED | OUTPATIENT
Start: 2024-09-03 | End: 2024-09-03 | Stop reason: HOSPADM

## 2024-09-03 RX ORDER — SODIUM CHLORIDE 0.9 % (FLUSH) 0.9 %
5-40 SYRINGE (ML) INJECTION EVERY 12 HOURS SCHEDULED
Status: DISCONTINUED | OUTPATIENT
Start: 2024-09-03 | End: 2024-09-03 | Stop reason: HOSPADM

## 2024-09-03 RX ORDER — NEOSTIGMINE METHYLSULFATE 1 MG/ML
INJECTION, SOLUTION INTRAVENOUS PRN
Status: DISCONTINUED | OUTPATIENT
Start: 2024-09-03 | End: 2024-09-03 | Stop reason: SDUPTHER

## 2024-09-03 RX ORDER — NALOXONE HYDROCHLORIDE 0.4 MG/ML
INJECTION, SOLUTION INTRAMUSCULAR; INTRAVENOUS; SUBCUTANEOUS PRN
Status: DISCONTINUED | OUTPATIENT
Start: 2024-09-03 | End: 2024-09-03 | Stop reason: HOSPADM

## 2024-09-03 RX ORDER — BUPIVACAINE HYDROCHLORIDE 5 MG/ML
INJECTION, SOLUTION EPIDURAL; INTRACAUDAL PRN
Status: DISCONTINUED | OUTPATIENT
Start: 2024-09-03 | End: 2024-09-03 | Stop reason: HOSPADM

## 2024-09-03 RX ORDER — MIDAZOLAM HYDROCHLORIDE 2 MG/2ML
2 INJECTION, SOLUTION INTRAMUSCULAR; INTRAVENOUS
Status: DISCONTINUED | OUTPATIENT
Start: 2024-09-03 | End: 2024-09-03 | Stop reason: HOSPADM

## 2024-09-03 RX ORDER — PROCHLORPERAZINE EDISYLATE 5 MG/ML
5 INJECTION INTRAMUSCULAR; INTRAVENOUS
Status: DISCONTINUED | OUTPATIENT
Start: 2024-09-03 | End: 2024-09-03 | Stop reason: HOSPADM

## 2024-09-03 RX ORDER — IBUPROFEN 800 MG/1
800 TABLET, FILM COATED ORAL 3 TIMES DAILY PRN
Qty: 90 TABLET | Refills: 0 | Status: SHIPPED | OUTPATIENT
Start: 2024-09-03

## 2024-09-03 RX ORDER — HYDRALAZINE HYDROCHLORIDE 20 MG/ML
10 INJECTION INTRAMUSCULAR; INTRAVENOUS ONCE
Status: DISCONTINUED | OUTPATIENT
Start: 2024-09-03 | End: 2024-09-03 | Stop reason: HOSPADM

## 2024-09-03 RX ORDER — FENTANYL CITRATE 50 UG/ML
INJECTION, SOLUTION INTRAMUSCULAR; INTRAVENOUS PRN
Status: DISCONTINUED | OUTPATIENT
Start: 2024-09-03 | End: 2024-09-03 | Stop reason: SDUPTHER

## 2024-09-03 RX ORDER — LIDOCAINE HYDROCHLORIDE 20 MG/ML
INJECTION, SOLUTION EPIDURAL; INFILTRATION; INTRACAUDAL; PERINEURAL PRN
Status: DISCONTINUED | OUTPATIENT
Start: 2024-09-03 | End: 2024-09-03 | Stop reason: SDUPTHER

## 2024-09-03 RX ORDER — LIDOCAINE HYDROCHLORIDE 10 MG/ML
1 INJECTION, SOLUTION EPIDURAL; INFILTRATION; INTRACAUDAL; PERINEURAL
Status: DISCONTINUED | OUTPATIENT
Start: 2024-09-03 | End: 2024-09-03 | Stop reason: HOSPADM

## 2024-09-03 RX ORDER — OXYCODONE HYDROCHLORIDE 5 MG/1
5 TABLET ORAL EVERY 6 HOURS PRN
Qty: 28 TABLET | Refills: 0 | Status: SHIPPED | OUTPATIENT
Start: 2024-09-03 | End: 2024-09-10

## 2024-09-03 RX ADMIN — WATER 2000 MG: 1 INJECTION INTRAMUSCULAR; INTRAVENOUS; SUBCUTANEOUS at 07:55

## 2024-09-03 RX ADMIN — ROCURONIUM BROMIDE 10 MG: 10 INJECTION, SOLUTION INTRAVENOUS at 08:35

## 2024-09-03 RX ADMIN — METHYLENE BLUE 5 ML: 5 INJECTION INTRAVENOUS at 09:00

## 2024-09-03 RX ADMIN — ACETAMINOPHEN 1000 MG: 500 TABLET ORAL at 06:21

## 2024-09-03 RX ADMIN — HYDROMORPHONE HYDROCHLORIDE 0.5 MG: 1 INJECTION, SOLUTION INTRAMUSCULAR; INTRAVENOUS; SUBCUTANEOUS at 09:31

## 2024-09-03 RX ADMIN — ROCURONIUM BROMIDE 10 MG: 10 INJECTION, SOLUTION INTRAVENOUS at 07:37

## 2024-09-03 RX ADMIN — FENTANYL CITRATE 25 MCG: 50 INJECTION INTRAMUSCULAR; INTRAVENOUS at 10:27

## 2024-09-03 RX ADMIN — DEXAMETHASONE SODIUM PHOSPHATE 8 MG: 4 INJECTION INTRA-ARTICULAR; INTRALESIONAL; INTRAMUSCULAR; INTRAVENOUS; SOFT TISSUE at 07:50

## 2024-09-03 RX ADMIN — ONDANSETRON 4 MG: 2 INJECTION INTRAMUSCULAR; INTRAVENOUS at 09:20

## 2024-09-03 RX ADMIN — FENTANYL CITRATE 100 MCG: 50 INJECTION, SOLUTION INTRAMUSCULAR; INTRAVENOUS at 07:37

## 2024-09-03 RX ADMIN — PROPOFOL 200 MG: 10 INJECTION, EMULSION INTRAVENOUS at 07:37

## 2024-09-03 RX ADMIN — Medication 80 MCG: at 08:04

## 2024-09-03 RX ADMIN — FENTANYL CITRATE 25 MCG: 50 INJECTION INTRAMUSCULAR; INTRAVENOUS at 10:33

## 2024-09-03 RX ADMIN — MIDAZOLAM 2 MG: 1 INJECTION INTRAMUSCULAR; INTRAVENOUS at 07:28

## 2024-09-03 RX ADMIN — HYDROMORPHONE HYDROCHLORIDE 0.5 MG: 1 INJECTION, SOLUTION INTRAMUSCULAR; INTRAVENOUS; SUBCUTANEOUS at 09:25

## 2024-09-03 RX ADMIN — NEOSTIGMINE METHYLSULFATE 2 MG: 1 INJECTION, SOLUTION INTRAVENOUS at 09:25

## 2024-09-03 RX ADMIN — OXYCODONE HYDROCHLORIDE 5 MG: 5 TABLET ORAL at 10:40

## 2024-09-03 RX ADMIN — EPHEDRINE SULFATE 5 MG: 50 INJECTION INTRAVENOUS at 08:10

## 2024-09-03 RX ADMIN — EPHEDRINE SULFATE 10 MG: 50 INJECTION INTRAVENOUS at 09:33

## 2024-09-03 RX ADMIN — ROCURONIUM BROMIDE 30 MG: 10 INJECTION, SOLUTION INTRAVENOUS at 07:50

## 2024-09-03 RX ADMIN — Medication 160 MG: at 07:37

## 2024-09-03 RX ADMIN — LIDOCAINE HYDROCHLORIDE 60 MG: 20 INJECTION, SOLUTION EPIDURAL; INFILTRATION; INTRACAUDAL; PERINEURAL at 07:37

## 2024-09-03 RX ADMIN — SODIUM CHLORIDE, POTASSIUM CHLORIDE, SODIUM LACTATE AND CALCIUM CHLORIDE: 600; 310; 30; 20 INJECTION, SOLUTION INTRAVENOUS at 06:20

## 2024-09-03 RX ADMIN — GLYCOPYRROLATE 0.4 MG: 0.2 INJECTION, SOLUTION INTRAMUSCULAR; INTRAVENOUS at 09:25

## 2024-09-03 ASSESSMENT — PAIN DESCRIPTION - LOCATION
LOCATION: ABDOMEN
LOCATION: ABDOMEN

## 2024-09-03 ASSESSMENT — PAIN DESCRIPTION - DESCRIPTORS
DESCRIPTORS: ACHING
DESCRIPTORS: ACHING

## 2024-09-03 ASSESSMENT — PAIN - FUNCTIONAL ASSESSMENT: PAIN_FUNCTIONAL_ASSESSMENT: 0-10

## 2024-09-03 ASSESSMENT — PAIN SCALES - GENERAL
PAINLEVEL_OUTOF10: 6
PAINLEVEL_OUTOF10: 7

## 2024-09-03 ASSESSMENT — PAIN DESCRIPTION - ORIENTATION: ORIENTATION: ANTERIOR

## 2024-09-03 NOTE — PROCEDURES
BRIEF OPERATIVE NOTE    Date of Procedure: Sept 3, 2024  Preoperative Diagnosis: Uterine leiomyoma, unspecified location [D25.9]; Menorrhagia  Postoperative Diagnosis:   Same  Procedure: Procedure(s):  ROBOTIC TOTAL LAPAROSCOPIC HYSTERECTOMY  Surgeons and Role:     * Leonard Royal MD - Primary  Anesthesia: General   Estimated Blood Loss: 50  Specimens: Uterus cervix bilateral fallopian tubes  Findings: multifibroid enlarged uterus   Complications: none  Implants: * No implants in log *

## 2024-09-03 NOTE — PERIOP NOTE
Discharge instructions reviewed with patient and spouse. IV removed. Medications sent to home pharmacy. Patient discharged via wheelchair.

## 2024-09-03 NOTE — FLOWSHEET NOTE
09/03/24 0835   Family Communication    Relationship to Patient Spouse    Phone Number Jules [phone]   Family/Significant Other Update Called   Delivery Origin Nurse   Update Given Yes   Family Communication   Family Update Message Procedure started;Surgeon working;Will update in 1-2 hours

## 2024-09-03 NOTE — DISCHARGE INSTRUCTIONS
______________________________________________________________________    Anesthesia Discharge Instructions    After general anesthesia or intervenous sedation, for 24 hours or while taking prescription Narcotics:  Limit your activities  Do not drive or operate hazardous machinery  If you have not urinated within 8 hours after discharge, please contact your surgeon on call.  Do not make important personal or business decisions  Do not drink alcoholic beverages    Report the following to your surgeon:  Excessive pain, swelling, redness or odor of or around the surgical area  Temperature over 100.5 degrees  Nausea and vomiting lasting longer than 4 hours or if unable to take medication  Any signs of decreased circulation or nerve impairment to extremity:  Change in color, persistent numbness, tingling, coldness or increased pain.  Any questions       *5mg Oxycodone given at 1040AM; Next dose not until 4:40PM if needed.

## 2024-09-03 NOTE — OP NOTE
[address]  [address]                            OPERATIVE REPORT      PATIENT NAME: PATIENCE WOLF         : 1990  MED REC NO: [id number]                       ROOM: OR  ACCOUNT NO: [id number]                       ADMIT DATE: 2024  PROVIDER: Leonard Royal MD    DATE OF SERVICE:  2024    PREOPERATIVE DIAGNOSES:       1. Menorrhagia.     2. Symptomatic uterine fibroids.    POSTOPERATIVE DIAGNOSES:       1. Menorrhagia.     2. Symptomatic uterine fibroids.     3. Status post robotic total laparoscopic hysterectomy.    PROCEDURES PERFORMED:  Robotic total laparoscopic hysterectomy with bilateral salpingectomy.    SURGEON:  Leonard Royal MD    ASSISTANT:  Surgical assist.    ANESTHESIA:  General.    ESTIMATED BLOOD LOSS:  50 mL.    SPECIMENS REMOVED:  Uterus, cervix, bilateral fallopian tubes.    INTRAOPERATIVE FINDINGS:  Uterus approximately 12 weeks in size.  Multilobulated and globular in appearance.  Fallopian tubes appeared to be normal.  Ovaries bilaterally appeared to be normal.     COMPLICATIONS:  None.    IMPLANTS:  None.    INDICATIONS:  The patient is a pleasant 34-year-old female, who came to the attention of Gyn Services secondary to complaints of an enlarging uterine mass felt abdominally and probable cycles of menorrhagia.  The patient previously had surgical management with myomectomy.  The patient desired definitive surgical management with hysterectomy.  The patient was counseled on the risks and benefits of the procedure.  She understood the risks could include bleeding, infection, risk of damage to nearby pelvic structures such as bowel, bladder, arteries, nerves, ureters, extensive risks of anesthesia, risk of venous thrombolic events, risk of cardiovascular events.  The patient signed her consent prior to being brought back to the operating room.    DESCRIPTION OF PROCEDURE:  The patient was

## 2024-09-03 NOTE — ANESTHESIA POSTPROCEDURE EVALUATION
Post-Anesthesia Evaluation and Assessment    Patient: Portia Chavarria MRN: [id number]  SSN: [id number]    YOB: 1990  Age: 34 y.o.  Sex: female      I have evaluated the patient and they are stable and ready for discharge from the PACU.     Cardiovascular Function/Vital Signs  Visit Vitals  /80   Pulse 66   Temp 97.6 °F (36.4 °C)   Resp 10   Ht 1.626 m (5' 4\")   Wt 54.4 kg (120 lb)   SpO2 100%   BMI 20.60 kg/m²       Patient is status post General anesthesia for Procedure(s):  ROBOTIC TOTAL LAPAROSCOPIC HYSTERECTOMY,CYSTOSCOPY.    Nausea/Vomiting: None    Postoperative hydration reviewed and adequate.    Pain:      Managed    Neurological Status:       At baseline    Mental Status, Level of Consciousness: Alert and  oriented to person, place, and time    Pulmonary Status:       Adequate oxygenation and airway patent    Complications related to anesthesia: None    Post-anesthesia assessment completed. No concerns    Signed By: Marbin Richards MD     September 3, 2024            Post-Anesthesia Evaluation and Assessment    Patient: Portia Chavarria MRN: [id number]  SSN: [id number]    YOB: 1990  Age: 34 y.o.  Sex: female      I have evaluated the patient and they are stable and ready for discharge from the PACU.     Cardiovascular Function/Vital Signs  Visit Vitals  /74   Pulse 83   Temp 97.6 °F (36.4 °C)   Resp 19   Ht 1.626 m (5' 4\")   Wt 54.4 kg (120 lb)   SpO2 100%   BMI 20.60 kg/m²       Patient is status post General anesthesia for Procedure(s):  ROBOTIC TOTAL LAPAROSCOPIC HYSTERECTOMY,CYSTOSCOPY.    Nausea/Vomiting: None    Postoperative hydration reviewed and adequate.    Pain:      Managed    Neurological Status:       At baseline    Mental Status, Level of Consciousness: Alert and  oriented to person, place, and time    Pulmonary Status:       Adequate oxygenation and airway patent    Complications related to anesthesia: None    Post-anesthesia assessment

## 2024-09-03 NOTE — ANESTHESIA PRE PROCEDURE
Department of Anesthesiology  Preprocedure Note       Name:  Portia Chavarria   Age:  34 y.o.  :  1990                                          MRN:  [id number]         Date:  9/3/2024      Surgeon: Surgeon(s):  Leonard Royal MD    Procedure: Procedure(s):  ROBOTIC TOTAL LAPAROSCOPIC HYSTERECTOMY    Medications prior to admission:   Prior to Admission medications    Medication Sig Start Date End Date Taking? Authorizing Provider   spironolactone (ALDACTONE) 100 MG tablet Take 1 tablet by mouth daily TAKES FOR PCOS   Yes Provider, MD Kinjal   Escitalopram Oxalate (LEXAPRO PO) Take 15 mg by mouth nightly   Yes Provider, MD Kinjal   atorvastatin (LIPITOR) 40 MG tablet Take 1 tablet by mouth nightly 17  Yes Automatic Reconciliation, Ar   megestrol (MEGACE) 20 MG tablet Take 1 tablet by mouth nightly    ProviderKinjal MD   ibuprofen (ADVIL;MOTRIN) 600 MG tablet Take 1 tablet by mouth as needed for Pain 21   Automatic Reconciliation, Ar   metFORMIN (GLUCOPHAGE) 1000 MG tablet Take 2 tablets by mouth nightly TAKES FOR PCOS    Automatic Reconciliation, Ar       Current medications:    Current Facility-Administered Medications   Medication Dose Route Frequency Provider Last Rate Last Admin   • ceFAZolin (ANCEF) 2,000 mg in sterile water 20 mL IV syringe  2,000 mg IntraVENous Once Leonard Royal MD       • lidocaine PF 1 % injection 1 mL  1 mL IntraDERmal Once PRN Christopher Johnson MD       • fentaNYL (SUBLIMAZE) injection 100 mcg  100 mcg IntraVENous Once PRN Christopher Johnson MD       • ondansetron (ZOFRAN) injection 4 mg  4 mg IntraVENous PRN Christopher Johnson MD       • lactated ringers IV soln infusion   IntraVENous Continuous Christopher Johnson MD       • sodium chloride flush 0.9 % injection 5-40 mL  5-40 mL IntraVENous 2 times per day Christopher Johnson MD       • sodium chloride flush 0.9 % injection 5-40 mL  5-40 mL IntraVENous PRN Christopher Johnson MD       • 0.9 % sodium chloride

## (undated) DEVICE — TROCAR ENDOSCP L100MM DIA12MM STBL SL BLDELSS ENDOPATH XCEL

## (undated) DEVICE — Z INACTIVE USE 2527070 DRAPE SURG W40XL44IN UNDERBUTTOCK SMS POLYPR W/ PCH BK DISP

## (undated) DEVICE — SUTURE SZ 0 27IN 5/8 CIR UR-6  TAPER PT VIOLET ABSRB VICRYL J603H

## (undated) DEVICE — TOWEL SURG W17XL27IN STD BLU COT NONFENESTRATED PREWASHED

## (undated) DEVICE — SUTURE STRATAFIX SPRL PDS + SZ 2-0 L9IN ABSRB VLT CT-1 SXPP1B456

## (undated) DEVICE — ELECTRO LUBE IS A SINGLE PATIENT USE DEVICE THAT IS INTENDED TO BE USED ON ELECTROSURGICAL ELECTRODES TO REDUCE STICKING.: Brand: KEY SURGICAL ELECTRO LUBE

## (undated) DEVICE — SUT STRATA PDS+ 15CM SZ 3-0 SH -- STRATAFIX

## (undated) DEVICE — TROCAR ENDOSCP L100MM DIA5MM BLDELSS STBL SL THRD OPT VW

## (undated) DEVICE — GLOVE ORANGE PI 7 1/2   MSG9075

## (undated) DEVICE — SOL IRR SOD CL 0.9% 3000ML --

## (undated) DEVICE — FILTER SMK EVAC FLO CLR MEGADYNE

## (undated) DEVICE — PREP SKN CHLRAPRP APL 26ML STR --

## (undated) DEVICE — Device

## (undated) DEVICE — POWDER HEMOSTAT GEL 3.0GR -- SURGICEL

## (undated) DEVICE — COVER LT HNDL PLAS RIG 1 PER PK

## (undated) DEVICE — APPLICATOR ENDOSCP 30 CM W/ SNAP LCK DUPLOSPRAY MIS

## (undated) DEVICE — BLADELESS OBTURATOR: Brand: WECK VISTA

## (undated) DEVICE — STERILE POLYISOPRENE POWDER-FREE SURGICAL GLOVES WITH EMOLLIENT COATING: Brand: PROTEXIS

## (undated) DEVICE — PAD,SANITARY,11 IN,MAXI,N-STRL,IND WRAP: Brand: MEDLINE

## (undated) DEVICE — PAD BD MATTRESS 73X32 IN STD CONVOLUTED FOAM LTX FREE

## (undated) DEVICE — PACK,LITHOTOMY,PK I: Brand: MEDLINE

## (undated) DEVICE — X-RAY DETECTABLE SPONGES,16 PLY: Brand: VISTEC

## (undated) DEVICE — BASIC SINGLE BASIN BTC-LF: Brand: MEDLINE INDUSTRIES, INC.

## (undated) DEVICE — TROCAR SITE CLOSURE DEVICE: Brand: ENDO CLOSE

## (undated) DEVICE — TRAY PREP DRY W/ PREM GLV 2 APPL 6 SPNG 2 UNDPD 1 OVERWRAP

## (undated) DEVICE — INSUFFLATION NEEDLE TO ESTABLISH PNEUMOPERITONEUM.: Brand: INSUFFLATION NEEDLE

## (undated) DEVICE — SUTURE VICRYL + SZ 0 L27IN ABSRB VLT L26MM UR-6 5/8 CIR VCP603H

## (undated) DEVICE — GOWN,SIRUS,NONRNF,SETINSLV,XL,20/CS: Brand: MEDLINE

## (undated) DEVICE — VISUALIZATION SYSTEM: Brand: CLEARIFY

## (undated) DEVICE — SUTURE MONOCRYL + SZ 4-0 L27IN ABSRB UD L19MM PS-2 3/8 CIR MCP426H

## (undated) DEVICE — NEEDLE INSUFFLATION 14 GAX120 MM SURGINEEDLE

## (undated) DEVICE — TIP COVER ACCESSORY

## (undated) DEVICE — OBTRTR BLDELSS 8MM DISP -- DA VINCI - SNGL USE

## (undated) DEVICE — TUBING INSUF HEAT STRL 10 FT --

## (undated) DEVICE — AIRSEAL 8 MM ACCESS PORT AND LOW PROFILE OBTURATOR WITH BLADELESS OPTICAL TIP, 100 MM LENGTH: Brand: AIRSEAL

## (undated) DEVICE — GLOVE SURG SZ 65 L12IN FNGR THK94MIL STD WHT LTX FREE

## (undated) DEVICE — NDL SINE QNCKE 22GAX1.5IN BLK --

## (undated) DEVICE — PAD PT POS 36 IN SURGYPAD DISP

## (undated) DEVICE — VCARE MEDIUM, UTERINE MANIPULATOR, VAGINAL-CERVICAL-AHLUWALIA'S-RETRACTOR-ELEVATOR: Brand: VCARE

## (undated) DEVICE — TOTAL TRAY, DB, 100% SILI FOLEY, 16FR 10: Brand: MEDLINE

## (undated) DEVICE — SPONGE GZ W4XL4IN COT RADPQ HIGHLY ABSRB

## (undated) DEVICE — SYRINGE MED 10ML LUERLOCK TIP W/O SFTY DISP

## (undated) DEVICE — TROCAR ENDOSCP SHFT L100MM DIA12MM INTEGR STBL ENDOPATH

## (undated) DEVICE — ARM DRAPE

## (undated) DEVICE — DRAPE SURG EQUIP W105XH13XL20IN 3 ARM DISPOSABLE DA VINCI S

## (undated) DEVICE — GLOVE SURG SZ 7 L12IN FNGR THK79MIL GRN LTX FREE

## (undated) DEVICE — PACK,BASIC,SIRUS,V: Brand: MEDLINE

## (undated) DEVICE — SEAL

## (undated) DEVICE — DRAPE,REIN 53X77,STERILE: Brand: MEDLINE

## (undated) DEVICE — VCARE DX UTERINE MANIPULATOR/INJECTOR CANNULA: Brand: VCARE DX

## (undated) DEVICE — SURGICAL PROCEDURE PACK GYN LAPAROSCOPY CUST SMH LF

## (undated) DEVICE — COVER MPLR TIP CRV SCIS ACC DA VINCI

## (undated) DEVICE — GARMENT,MEDLINE,DVT,INT,CALF,MED, GEN2: Brand: MEDLINE

## (undated) DEVICE — SOL IRR SOD CL 0.9% 1000ML BTL --

## (undated) DEVICE — SET SEALS HYSTEROSCOPE DISP -- MYOSURE  EA=10

## (undated) DEVICE — SUTURE MCRYL SZ 4-0 L27IN ABSRB UD L19MM PS-2 1/2 CIR PRIM Y426H

## (undated) DEVICE — SURGICEL ENDOSCP APPL

## (undated) DEVICE — SYR 10ML LUER LOK 1/5ML GRAD --

## (undated) DEVICE — SOLUTION IV 1000ML 0.9% SOD CHL PH 5 INJ USP VIAFLX PLAS

## (undated) DEVICE — GYN LAPAROSCOPY - SMH: Brand: MEDLINE INDUSTRIES, INC.

## (undated) DEVICE — MARKER,SKIN,WI/RULER AND LABELS: Brand: MEDLINE

## (undated) DEVICE — SYSTEM FASCIAL CLSR UNIQUE SHLDED WNG SAFE UNIF CONSISTENT

## (undated) DEVICE — DERMABOND SKIN ADH 0.7ML -- DERMABOND ADVANCED 12/BX

## (undated) DEVICE — TISSUE RETRIEVAL SYSTEM: Brand: INZII RETRIEVAL SYSTEM

## (undated) DEVICE — TRI-LUMEN FILTERED TUBE SET WITH ACTIVATED CHARCOAL FILTER: Brand: AIRSEAL

## (undated) DEVICE — NEEDLE SPNL 22GA L3.5IN BLK HUB S STL REG WALL FIT STYL W/

## (undated) DEVICE — INFECTION CONTROL KIT SYS

## (undated) DEVICE — STRAP,POSITIONING,KNEE/BODY,FOAM,4X60": Brand: MEDLINE